# Patient Record
Sex: MALE | Race: WHITE | Employment: FULL TIME | URBAN - METROPOLITAN AREA
[De-identification: names, ages, dates, MRNs, and addresses within clinical notes are randomized per-mention and may not be internally consistent; named-entity substitution may affect disease eponyms.]

---

## 2018-07-09 PROBLEM — R77.8 ELEVATED TROPONIN: Status: ACTIVE | Noted: 2018-07-09

## 2018-07-09 PROBLEM — R07.9 CHEST PAIN IN ADULT: Status: ACTIVE | Noted: 2018-07-09

## 2018-07-10 PROBLEM — I21.4 NSTEMI (NON-ST ELEVATED MYOCARDIAL INFARCTION) (HCC): Status: ACTIVE | Noted: 2018-07-10

## 2018-07-11 PROBLEM — I25.10 CORONARY ARTERY DISEASE DUE TO LIPID RICH PLAQUE: Status: ACTIVE | Noted: 2018-07-11

## 2018-07-11 PROBLEM — Z95.5 STATUS POST INSERTION OF DRUG-ELUTING STENT INTO LEFT ANTERIOR DESCENDING (LAD) ARTERY: Status: ACTIVE | Noted: 2018-07-11

## 2018-07-11 PROBLEM — I25.83 CORONARY ARTERY DISEASE DUE TO LIPID RICH PLAQUE: Status: ACTIVE | Noted: 2018-07-11

## 2018-07-18 ENCOUNTER — OFFICE VISIT (OUTPATIENT)
Dept: CARDIOLOGY CLINIC | Age: 58
End: 2018-07-18

## 2018-07-18 VITALS
SYSTOLIC BLOOD PRESSURE: 110 MMHG | HEART RATE: 78 BPM | WEIGHT: 261 LBS | BODY MASS INDEX: 34.43 KG/M2 | DIASTOLIC BLOOD PRESSURE: 72 MMHG

## 2018-07-18 DIAGNOSIS — I21.4 NSTEMI (NON-ST ELEVATED MYOCARDIAL INFARCTION) (HCC): Primary | ICD-10-CM

## 2018-07-18 DIAGNOSIS — Z95.5 STATUS POST INSERTION OF DRUG-ELUTING STENT INTO LEFT ANTERIOR DESCENDING (LAD) ARTERY: ICD-10-CM

## 2018-07-18 DIAGNOSIS — E78.2 HYPERLIPEMIA, MIXED: ICD-10-CM

## 2018-07-18 DIAGNOSIS — I10 ESSENTIAL HYPERTENSION: ICD-10-CM

## 2018-07-18 PROCEDURE — 93000 ELECTROCARDIOGRAM COMPLETE: CPT | Performed by: NURSE PRACTITIONER

## 2018-07-18 PROCEDURE — 99214 OFFICE O/P EST MOD 30 MIN: CPT | Performed by: NURSE PRACTITIONER

## 2018-07-18 NOTE — PROGRESS NOTES
07/10/2018     No results found for: TSH, A0VJGQL, X0CVFZV, THYROIDAB  Lab Results   Component Value Date    WBC 11.0 07/11/2018    HGB 15.6 07/11/2018    HCT 44.9 07/11/2018    MCV 88.1 07/11/2018     07/11/2018     No components found for: CHLPL  Lab Results   Component Value Date    TRIG 172 (H) 07/10/2018     Lab Results   Component Value Date    HDL 36 (L) 07/10/2018     Lab Results   Component Value Date    LDLCALC 102 (H) 07/10/2018     Lab Results   Component Value Date    LABVLDL 34 07/10/2018     Radiology Review:  Pertinent images / reports were reviewed as a part of this visit and reveals the following:    Cardiac Cath 7/9/18:  Anatomy:   LM-normal  LAD-patent proximal stent, 100% mid  Cx-20% proximal and mid  OM1- patent  RCA-10% distal diffuse, codominant  RPDA- patent  LVEF- 40% with mid to distal anterior hypokinesis  PCI: % to 0% with a 2.75 mm x 16 mm [employer] Synergy INNA. Impression:  1. Critically occluded mid LAD with successful revascularization using INNA. 2.  Mild to moderate LV systolic dysfunction. Plan:  1. Aspirin 81 mg daily indefinitely  2. Effient/equivalent for minimum of 1 year. 3.  Aspirin 81 mg daily, Effient 10 mg daily, Lipitor 40 mg nightly, Toprol-XL 25 mg daily, lisinopril 5 mg by mouth daily. Compliance with medical regimen discussed in detail with patient and daughter pre-and postprocedure. Last ECHO: 7/9/18  Normal left ventricle size, wall thickness and systolic function with an EF   of 55%     Although not well visualized, there is possible mild hyopkinesis of the   apicoseptal wall     Diastolic filling parameters suggest grade I diastolic dysfunction. E/e'=9.7. Trivial tricuspid regurgitation with RVSP of 31 mmHg.          Assessment:   ~ Coronary artery disease    7/9/18: S/p cardiac angiogram/ PCI of LAD     On ASA, Effient,  Lisinopril, and Lipitor    Patient denies any anginal symptoms    Plan: continue current

## 2018-07-23 ENCOUNTER — HOSPITAL ENCOUNTER (OUTPATIENT)
Dept: NON INVASIVE DIAGNOSTICS | Age: 58
Discharge: HOME | End: 2018-07-23
Attending: NURSE PRACTITIONER | Admitting: NURSE PRACTITIONER

## 2018-07-23 DIAGNOSIS — I21.4 NON-ST ELEVATION (NSTEMI) MYOCARDIAL INFARCTION (HCC): ICD-10-CM

## 2018-08-06 ENCOUNTER — TELEPHONE (OUTPATIENT)
Dept: CARDIOLOGY CLINIC | Age: 58
End: 2018-08-06

## 2018-08-06 NOTE — TELEPHONE ENCOUNTER
Medication Refill    When was your last appointment with cardiology?  (if 1year or longer, please schedule an appointment)    Medication needing refilled:ASA 81mg , atorvastatin , lisinopril , effient   Doseage of the medication:    How are you taking this medication (QD, BID, TID, QID, PRN):    Patient want a 30 or 90 day supply called in:    Which Pharmacy are we sending the medication to:[facility] [address] in [address] Phone number:    Pharmacy Fax number:    Saw npkk 7/18/18 needs refills by tomorrow

## 2018-08-07 RX ORDER — ASPIRIN 81 MG/1
81 TABLET, CHEWABLE ORAL DAILY
Qty: 90 TABLET | Refills: 3 | Status: SHIPPED | OUTPATIENT
Start: 2018-08-07

## 2018-08-07 RX ORDER — PRASUGREL 10 MG/1
10 TABLET, FILM COATED ORAL DAILY
Qty: 90 TABLET | Refills: 1 | Status: SHIPPED | OUTPATIENT
Start: 2018-08-07 | End: 2018-10-26 | Stop reason: SDUPTHER

## 2018-08-07 RX ORDER — LISINOPRIL 5 MG/1
5 TABLET ORAL DAILY
Qty: 90 TABLET | Refills: 3 | Status: SHIPPED | OUTPATIENT
Start: 2018-08-07 | End: 2019-08-22 | Stop reason: DRUGHIGH

## 2018-08-07 RX ORDER — ATORVASTATIN CALCIUM 40 MG/1
40 TABLET, FILM COATED ORAL NIGHTLY
Qty: 90 TABLET | Refills: 3 | Status: SHIPPED | OUTPATIENT
Start: 2018-08-07 | End: 2019-09-18 | Stop reason: SDUPTHER

## 2018-08-08 PROBLEM — R77.8 ELEVATED TROPONIN: Status: RESOLVED | Noted: 2018-07-09 | Resolved: 2018-08-08

## 2018-10-15 NOTE — PROGRESS NOTES
[address]     Outpatient Follow Up Note    CHIEF COMPLAINT / HPI:  Follow Up secondary to coronary artery disease/ Hypertension/ and hyperlipidemia       Meron Kang is 61 y.o. male who presents today for a routine follow up  related to the above mentioned issues. Subjective:   Since the time of last office visit, the patient admits their symptoms have      HPI: patient is being seen for a routine office visit secondary to CAD s/p PCI of LAD ( 7/9/18)/ hypertension/ and hyperlipidemia. Today's visit patient stated he stopped taking all of his medication a week ago. He is only taken supplements. He has agreed at today's OV to take Effient and Lipitor. He denies any chest pain, palpitations, SOB, dizziness, or edema. Past Medical History:   Diagnosis Date    CAD (coronary artery disease)      Social History:    History   Smoking Status    Never Smoker   Smokeless Tobacco    Never Used     Current Medications:  Current Outpatient Prescriptions   Medication Sig Dispense Refill    aspirin 81 MG chewable tablet Take 1 tablet by mouth daily 90 tablet 3    atorvastatin (LIPITOR) 40 MG tablet Take 1 tablet by mouth nightly 90 tablet 3    lisinopril (PRINIVIL;ZESTRIL) 5 MG tablet Take 1 tablet by mouth daily 90 tablet 3    prasugrel (EFFIENT) 10 MG TABS Take 1 tablet by mouth daily 90 tablet 1    nitroGLYCERIN (NITROSTAT) 0.4 MG SL tablet up to max of 3 total doses. If no relief after 1 dose, call 911. 25 tablet 3     No current facility-administered medications for this visit. REVIEW OF SYSTEMS:   CONSTITUTIONAL: No major weight gain or loss, fatigue, weakness, night sweats or fever. There's been no change in energy level, sleep pattern, or activity level. HEENT: No new vision difficulties or ringing in the ears. RESPIRATORY: No new SOB, PND, orthopnea or cough.    CARDIOVASCULAR: See HPI  GI: No nausea, vomiting, diarrhea, constipation, abdominal pain or changes in bowel

## 2018-10-16 ENCOUNTER — OFFICE VISIT (OUTPATIENT)
Dept: CARDIOLOGY CLINIC | Age: 58
End: 2018-10-16
Payer: COMMERCIAL

## 2018-10-16 VITALS
HEIGHT: 73 IN | DIASTOLIC BLOOD PRESSURE: 80 MMHG | OXYGEN SATURATION: 99 % | BODY MASS INDEX: 33.24 KG/M2 | SYSTOLIC BLOOD PRESSURE: 120 MMHG | WEIGHT: 250.8 LBS | HEART RATE: 82 BPM

## 2018-10-16 DIAGNOSIS — I10 ESSENTIAL HYPERTENSION: ICD-10-CM

## 2018-10-16 DIAGNOSIS — I25.10 CORONARY ARTERY DISEASE DUE TO LIPID RICH PLAQUE: Primary | ICD-10-CM

## 2018-10-16 DIAGNOSIS — I25.83 CORONARY ARTERY DISEASE DUE TO LIPID RICH PLAQUE: Primary | ICD-10-CM

## 2018-10-16 DIAGNOSIS — E78.2 HYPERLIPEMIA, MIXED: ICD-10-CM

## 2018-10-16 PROCEDURE — 99214 OFFICE O/P EST MOD 30 MIN: CPT | Performed by: NURSE PRACTITIONER

## 2018-10-16 RX ORDER — CHLORAL HYDRATE 500 MG
1000 CAPSULE ORAL DAILY
COMMUNITY

## 2018-10-16 RX ORDER — ASCORBIC ACID 500 MG
500 TABLET ORAL DAILY
COMMUNITY

## 2018-10-16 RX ORDER — LANOLIN ALCOHOL/MO/W.PET/CERES
1000 CREAM (GRAM) TOPICAL DAILY
COMMUNITY

## 2018-10-25 ENCOUNTER — TELEPHONE (OUTPATIENT)
Dept: CARDIOLOGY CLINIC | Age: 58
End: 2018-10-25

## 2018-10-26 RX ORDER — PRASUGREL 10 MG/1
10 TABLET, FILM COATED ORAL DAILY
Qty: 90 TABLET | Refills: 1 | Status: SHIPPED | OUTPATIENT
Start: 2018-10-26 | End: 2019-08-22 | Stop reason: ALTCHOICE

## 2019-01-14 ENCOUNTER — HOSPITAL ENCOUNTER (INPATIENT)
Age: 59
LOS: 1 days | Discharge: HOME | DRG: 378 | End: 2019-01-15
Attending: EMERGENCY MEDICINE | Admitting: INTERNAL MEDICINE
Payer: COMMERCIAL

## 2019-01-14 DIAGNOSIS — K92.2 ACUTE UPPER GI BLEED: Primary | ICD-10-CM

## 2019-01-14 DIAGNOSIS — R58 HYPOTENSION DUE TO BLOOD LOSS: ICD-10-CM

## 2019-01-14 DIAGNOSIS — I95.89 HYPOTENSION DUE TO BLOOD LOSS: ICD-10-CM

## 2019-01-14 PROCEDURE — 99291 CRITICAL CARE FIRST HOUR: CPT

## 2019-01-14 PROCEDURE — 93005 ELECTROCARDIOGRAM TRACING: CPT | Performed by: INTERNAL MEDICINE

## 2019-01-14 RX ORDER — 0.9 % SODIUM CHLORIDE 0.9 %
1000 INTRAVENOUS SOLUTION INTRAVENOUS ONCE
Status: COMPLETED | OUTPATIENT
Start: 2019-01-15 | End: 2019-01-15

## 2019-01-15 ENCOUNTER — ANESTHESIA (OUTPATIENT)
Dept: ENDOSCOPY | Age: 59
DRG: 378 | End: 2019-01-15
Payer: COMMERCIAL

## 2019-01-15 ENCOUNTER — ANESTHESIA EVENT (OUTPATIENT)
Dept: ENDOSCOPY | Age: 59
DRG: 378 | End: 2019-01-15
Payer: COMMERCIAL

## 2019-01-15 VITALS
HEIGHT: 73 IN | DIASTOLIC BLOOD PRESSURE: 70 MMHG | RESPIRATION RATE: 11 BRPM | OXYGEN SATURATION: 99 % | TEMPERATURE: 98.4 F | HEART RATE: 83 BPM | WEIGHT: 243.61 LBS | SYSTOLIC BLOOD PRESSURE: 108 MMHG | BODY MASS INDEX: 32.29 KG/M2

## 2019-01-15 VITALS
OXYGEN SATURATION: 98 % | RESPIRATION RATE: 16 BRPM | DIASTOLIC BLOOD PRESSURE: 55 MMHG | SYSTOLIC BLOOD PRESSURE: 103 MMHG

## 2019-01-15 PROBLEM — K92.2 GI BLEED: Status: ACTIVE | Noted: 2019-01-15

## 2019-01-15 PROBLEM — D62 ACUTE BLOOD LOSS ANEMIA: Status: ACTIVE | Noted: 2019-01-15

## 2019-01-15 LAB
A/G RATIO: 1.9 (ref 1.1–2.2)
ABO/RH: NORMAL
ALBUMIN SERPL-MCNC: 3.9 G/DL (ref 3.4–5)
ALP BLD-CCNC: 38 U/L (ref 40–129)
ALT SERPL-CCNC: 15 U/L (ref 10–40)
ANION GAP SERPL CALCULATED.3IONS-SCNC: 10 MMOL/L (ref 3–16)
ANTIBODY SCREEN: NORMAL
APTT: 24 SEC (ref 26–36)
AST SERPL-CCNC: 12 U/L (ref 15–37)
BASOPHILS ABSOLUTE: 0 K/UL (ref 0–0.2)
BASOPHILS RELATIVE PERCENT: 0.3 %
BILIRUB SERPL-MCNC: 1.6 MG/DL (ref 0–1)
BLOOD BANK DISPENSE STATUS: NORMAL
BLOOD BANK PRODUCT CODE: NORMAL
BPU ID: NORMAL
BUN BLDV-MCNC: 51 MG/DL (ref 7–20)
CALCIUM SERPL-MCNC: 8.8 MG/DL (ref 8.3–10.6)
CHLORIDE BLD-SCNC: 105 MMOL/L (ref 99–110)
CO2: 24 MMOL/L (ref 21–32)
CREAT SERPL-MCNC: 0.6 MG/DL (ref 0.9–1.3)
DESCRIPTION BLOOD BANK: NORMAL
EKG ATRIAL RATE: 127 BPM
EKG DIAGNOSIS: NORMAL
EKG P AXIS: 67 DEGREES
EKG P-R INTERVAL: 128 MS
EKG Q-T INTERVAL: 322 MS
EKG QRS DURATION: 84 MS
EKG QTC CALCULATION (BAZETT): 467 MS
EKG R AXIS: 55 DEGREES
EKG T AXIS: 70 DEGREES
EKG VENTRICULAR RATE: 127 BPM
EOSINOPHILS ABSOLUTE: 0 K/UL (ref 0–0.6)
EOSINOPHILS RELATIVE PERCENT: 0.2 %
GFR AFRICAN AMERICAN: >60
GFR NON-AFRICAN AMERICAN: >60
GLOBULIN: 2.1 G/DL
GLUCOSE BLD-MCNC: 202 MG/DL (ref 70–99)
HCT VFR BLD CALC: 26.5 % (ref 40.5–52.5)
HCT VFR BLD CALC: 30.1 % (ref 40.5–52.5)
HCT VFR BLD CALC: 30.6 % (ref 40.5–52.5)
HCT VFR BLD CALC: 31.6 % (ref 40.5–52.5)
HEMOGLOBIN: 10.1 G/DL (ref 13.5–17.5)
HEMOGLOBIN: 10.3 G/DL (ref 13.5–17.5)
HEMOGLOBIN: 10.7 G/DL (ref 13.5–17.5)
HEMOGLOBIN: 9 G/DL (ref 13.5–17.5)
INR BLD: 1.21 (ref 0.86–1.14)
LYMPHOCYTES ABSOLUTE: 2.8 K/UL (ref 1–5.1)
LYMPHOCYTES RELATIVE PERCENT: 19.7 %
MCH RBC QN AUTO: 29.4 PG (ref 26–34)
MCH RBC QN AUTO: 30.4 PG (ref 26–34)
MCHC RBC AUTO-ENTMCNC: 33.5 G/DL (ref 31–36)
MCHC RBC AUTO-ENTMCNC: 33.9 G/DL (ref 31–36)
MCV RBC AUTO: 87.7 FL (ref 80–100)
MCV RBC AUTO: 89.5 FL (ref 80–100)
MONOCYTES ABSOLUTE: 0.5 K/UL (ref 0–1.3)
MONOCYTES RELATIVE PERCENT: 3.6 %
NEUTROPHILS ABSOLUTE: 10.9 K/UL (ref 1.7–7.7)
NEUTROPHILS RELATIVE PERCENT: 76.2 %
OCCULT BLOOD DIAGNOSTIC: ABNORMAL
PDW BLD-RTO: 13.1 % (ref 12.4–15.4)
PDW BLD-RTO: 13.8 % (ref 12.4–15.4)
PLATELET # BLD: 215 K/UL (ref 135–450)
PLATELET # BLD: 244 K/UL (ref 135–450)
PMV BLD AUTO: 7.5 FL (ref 5–10.5)
PMV BLD AUTO: 8.2 FL (ref 5–10.5)
POTASSIUM REFLEX MAGNESIUM: 4.4 MMOL/L (ref 3.5–5.1)
PROTHROMBIN TIME: 13.8 SEC (ref 9.8–13)
RBC # BLD: 3.49 M/UL (ref 4.2–5.9)
RBC # BLD: 3.54 M/UL (ref 4.2–5.9)
SODIUM BLD-SCNC: 139 MMOL/L (ref 136–145)
TOTAL PROTEIN: 6 G/DL (ref 6.4–8.2)
WBC # BLD: 10.1 K/UL (ref 4–11)
WBC # BLD: 14.3 K/UL (ref 4–11)

## 2019-01-15 PROCEDURE — 2709999900 HC NON-CHARGEABLE SUPPLY: Performed by: INTERNAL MEDICINE

## 2019-01-15 PROCEDURE — 2580000003 HC RX 258: Performed by: ANESTHESIOLOGY

## 2019-01-15 PROCEDURE — 93010 ELECTROCARDIOGRAM REPORT: CPT | Performed by: INTERNAL MEDICINE

## 2019-01-15 PROCEDURE — 85610 PROTHROMBIN TIME: CPT

## 2019-01-15 PROCEDURE — 88305 TISSUE EXAM BY PATHOLOGIST: CPT

## 2019-01-15 PROCEDURE — 86923 COMPATIBILITY TEST ELECTRIC: CPT

## 2019-01-15 PROCEDURE — P9016 RBC LEUKOCYTES REDUCED: HCPCS

## 2019-01-15 PROCEDURE — 85025 COMPLETE CBC W/AUTO DIFF WBC: CPT

## 2019-01-15 PROCEDURE — 3700000001 HC ADD 15 MINUTES (ANESTHESIA): Performed by: INTERNAL MEDICINE

## 2019-01-15 PROCEDURE — 3700000000 HC ANESTHESIA ATTENDED CARE: Performed by: INTERNAL MEDICINE

## 2019-01-15 PROCEDURE — 0DB78ZX EXCISION OF STOMACH, PYLORUS, VIA NATURAL OR ARTIFICIAL OPENING ENDOSCOPIC, DIAGNOSTIC: ICD-10-PCS | Performed by: INTERNAL MEDICINE

## 2019-01-15 PROCEDURE — 6360000002 HC RX W HCPCS: Performed by: NURSE ANESTHETIST, CERTIFIED REGISTERED

## 2019-01-15 PROCEDURE — 6370000000 HC RX 637 (ALT 250 FOR IP): Performed by: INTERNAL MEDICINE

## 2019-01-15 PROCEDURE — 85018 HEMOGLOBIN: CPT

## 2019-01-15 PROCEDURE — 2580000003 HC RX 258: Performed by: EMERGENCY MEDICINE

## 2019-01-15 PROCEDURE — 85014 HEMATOCRIT: CPT

## 2019-01-15 PROCEDURE — C9113 INJ PANTOPRAZOLE SODIUM, VIA: HCPCS | Performed by: PHYSICIAN ASSISTANT

## 2019-01-15 PROCEDURE — G0328 FECAL BLOOD SCRN IMMUNOASSAY: HCPCS

## 2019-01-15 PROCEDURE — 7100000001 HC PACU RECOVERY - ADDTL 15 MIN: Performed by: INTERNAL MEDICINE

## 2019-01-15 PROCEDURE — 3609012400 HC EGD TRANSORAL BIOPSY SINGLE/MULTIPLE: Performed by: INTERNAL MEDICINE

## 2019-01-15 PROCEDURE — 86900 BLOOD TYPING SEROLOGIC ABO: CPT

## 2019-01-15 PROCEDURE — 2580000003 HC RX 258: Performed by: INTERNAL MEDICINE

## 2019-01-15 PROCEDURE — P9035 PLATELET PHERES LEUKOREDUCED: HCPCS

## 2019-01-15 PROCEDURE — 85730 THROMBOPLASTIN TIME PARTIAL: CPT

## 2019-01-15 PROCEDURE — 2500000003 HC RX 250 WO HCPCS: Performed by: NURSE ANESTHETIST, CERTIFIED REGISTERED

## 2019-01-15 PROCEDURE — 7100000000 HC PACU RECOVERY - FIRST 15 MIN: Performed by: INTERNAL MEDICINE

## 2019-01-15 PROCEDURE — 6360000002 HC RX W HCPCS: Performed by: PHYSICIAN ASSISTANT

## 2019-01-15 PROCEDURE — 88342 IMHCHEM/IMCYTCHM 1ST ANTB: CPT

## 2019-01-15 PROCEDURE — 85027 COMPLETE CBC AUTOMATED: CPT

## 2019-01-15 PROCEDURE — 86850 RBC ANTIBODY SCREEN: CPT

## 2019-01-15 PROCEDURE — 86901 BLOOD TYPING SEROLOGIC RH(D): CPT

## 2019-01-15 PROCEDURE — 3609013000 HC EGD TRANSORAL CONTROL BLEEDING ANY METHOD: Performed by: INTERNAL MEDICINE

## 2019-01-15 PROCEDURE — 36430 TRANSFUSION BLD/BLD COMPNT: CPT

## 2019-01-15 PROCEDURE — 2720000010 HC SURG SUPPLY STERILE: Performed by: INTERNAL MEDICINE

## 2019-01-15 PROCEDURE — 96365 THER/PROPH/DIAG IV INF INIT: CPT

## 2019-01-15 PROCEDURE — 2580000003 HC RX 258: Performed by: PHYSICIAN ASSISTANT

## 2019-01-15 PROCEDURE — 94760 N-INVAS EAR/PLS OXIMETRY 1: CPT

## 2019-01-15 PROCEDURE — 80053 COMPREHEN METABOLIC PANEL: CPT

## 2019-01-15 PROCEDURE — 0W3P8ZZ CONTROL BLEEDING IN GASTROINTESTINAL TRACT, VIA NATURAL OR ARTIFICIAL OPENING ENDOSCOPIC: ICD-10-PCS | Performed by: INTERNAL MEDICINE

## 2019-01-15 PROCEDURE — 2000000000 HC ICU R&B

## 2019-01-15 RX ORDER — ONDANSETRON 2 MG/ML
4 INJECTION INTRAMUSCULAR; INTRAVENOUS EVERY 6 HOURS PRN
Status: DISCONTINUED | OUTPATIENT
Start: 2019-01-15 | End: 2019-01-15 | Stop reason: HOSPADM

## 2019-01-15 RX ORDER — 0.9 % SODIUM CHLORIDE 0.9 %
250 INTRAVENOUS SOLUTION INTRAVENOUS ONCE
Status: COMPLETED | OUTPATIENT
Start: 2019-01-15 | End: 2019-01-15

## 2019-01-15 RX ORDER — SODIUM CHLORIDE 0.9 % (FLUSH) 0.9 %
10 SYRINGE (ML) INJECTION EVERY 12 HOURS SCHEDULED
Status: DISCONTINUED | OUTPATIENT
Start: 2019-01-15 | End: 2019-01-15 | Stop reason: HOSPADM

## 2019-01-15 RX ORDER — PANTOPRAZOLE SODIUM 40 MG/1
40 TABLET, DELAYED RELEASE ORAL
Qty: 30 TABLET | Refills: 3 | Status: SHIPPED | OUTPATIENT
Start: 2019-01-16 | End: 2020-05-04 | Stop reason: SDUPTHER

## 2019-01-15 RX ORDER — GLYCOPYRROLATE 0.2 MG/ML
INJECTION INTRAMUSCULAR; INTRAVENOUS PRN
Status: DISCONTINUED | OUTPATIENT
Start: 2019-01-15 | End: 2019-01-15 | Stop reason: SDUPTHER

## 2019-01-15 RX ORDER — PANTOPRAZOLE SODIUM 40 MG/1
40 TABLET, DELAYED RELEASE ORAL
Status: DISCONTINUED | OUTPATIENT
Start: 2019-01-15 | End: 2019-01-15 | Stop reason: HOSPADM

## 2019-01-15 RX ORDER — SODIUM CHLORIDE 9 MG/ML
INJECTION, SOLUTION INTRAVENOUS CONTINUOUS
Status: DISCONTINUED | OUTPATIENT
Start: 2019-01-15 | End: 2019-01-15 | Stop reason: HOSPADM

## 2019-01-15 RX ORDER — PROPOFOL 10 MG/ML
INJECTION, EMULSION INTRAVENOUS PRN
Status: DISCONTINUED | OUTPATIENT
Start: 2019-01-15 | End: 2019-01-15 | Stop reason: SDUPTHER

## 2019-01-15 RX ORDER — SODIUM CHLORIDE 0.9 % (FLUSH) 0.9 %
10 SYRINGE (ML) INJECTION PRN
Status: DISCONTINUED | OUTPATIENT
Start: 2019-01-15 | End: 2019-01-15 | Stop reason: HOSPADM

## 2019-01-15 RX ORDER — LIDOCAINE HYDROCHLORIDE 20 MG/ML
INJECTION, SOLUTION EPIDURAL; INFILTRATION; INTRACAUDAL; PERINEURAL PRN
Status: DISCONTINUED | OUTPATIENT
Start: 2019-01-15 | End: 2019-01-15 | Stop reason: SDUPTHER

## 2019-01-15 RX ADMIN — SODIUM CHLORIDE 1000 ML: 9 INJECTION, SOLUTION INTRAVENOUS at 00:32

## 2019-01-15 RX ADMIN — PANTOPRAZOLE SODIUM 40 MG: 40 TABLET, DELAYED RELEASE ORAL at 12:24

## 2019-01-15 RX ADMIN — SODIUM CHLORIDE 8 MG/HR: 9 INJECTION, SOLUTION INTRAVENOUS at 01:02

## 2019-01-15 RX ADMIN — SODIUM CHLORIDE: 9 INJECTION, SOLUTION INTRAVENOUS at 10:12

## 2019-01-15 RX ADMIN — SODIUM CHLORIDE 1000 ML: 9 INJECTION, SOLUTION INTRAVENOUS at 00:31

## 2019-01-15 RX ADMIN — SODIUM CHLORIDE 80 MG: 9 INJECTION, SOLUTION INTRAVENOUS at 00:32

## 2019-01-15 RX ADMIN — PROPOFOL 200 MG: 10 INJECTION, EMULSION INTRAVENOUS at 10:40

## 2019-01-15 RX ADMIN — LIDOCAINE HYDROCHLORIDE 100 MG: 20 INJECTION, SOLUTION EPIDURAL; INFILTRATION; INTRACAUDAL; PERINEURAL at 10:40

## 2019-01-15 RX ADMIN — Medication 10 ML: at 07:59

## 2019-01-15 RX ADMIN — SODIUM CHLORIDE 250 ML: 9 INJECTION, SOLUTION INTRAVENOUS at 02:43

## 2019-01-15 RX ADMIN — GLYCOPYRROLATE 0.2 MG: 0.2 INJECTION, SOLUTION INTRAMUSCULAR; INTRAVENOUS at 10:38

## 2019-01-15 RX ADMIN — PROPOFOL 50 MG: 10 INJECTION, EMULSION INTRAVENOUS at 10:45

## 2019-01-15 ASSESSMENT — PULMONARY FUNCTION TESTS
PIF_VALUE: 0

## 2019-01-15 ASSESSMENT — ENCOUNTER SYMPTOMS
COLOR CHANGE: 0
ABDOMINAL PAIN: 0
NAUSEA: 0
CONSTIPATION: 0
BLOOD IN STOOL: 1
SHORTNESS OF BREATH: 0
VOMITING: 0
SHORTNESS OF BREATH: 0
DIARRHEA: 0
BACK PAIN: 0

## 2019-01-15 ASSESSMENT — PAIN SCALES - GENERAL
PAINLEVEL_OUTOF10: 0

## 2019-01-15 ASSESSMENT — PAIN - FUNCTIONAL ASSESSMENT: PAIN_FUNCTIONAL_ASSESSMENT: 0-10

## 2019-02-11 ENCOUNTER — HOSPITAL ENCOUNTER (INPATIENT)
Age: 59
LOS: 2 days | Discharge: HOME | DRG: 274 | End: 2019-02-13
Attending: EMERGENCY MEDICINE | Admitting: FAMILY MEDICINE
Payer: COMMERCIAL

## 2019-02-11 ENCOUNTER — APPOINTMENT (OUTPATIENT)
Dept: GENERAL RADIOLOGY | Age: 59
DRG: 274 | End: 2019-02-11
Payer: COMMERCIAL

## 2019-02-11 DIAGNOSIS — I47.1 SVT (SUPRAVENTRICULAR TACHYCARDIA) (HCC): Primary | ICD-10-CM

## 2019-02-11 LAB
A/G RATIO: 1.7 (ref 1.1–2.2)
ALBUMIN SERPL-MCNC: 4 G/DL (ref 3.4–5)
ALP BLD-CCNC: 50 U/L (ref 40–129)
ALT SERPL-CCNC: 29 U/L (ref 10–40)
ANION GAP SERPL CALCULATED.3IONS-SCNC: 11 MMOL/L (ref 3–16)
AST SERPL-CCNC: 32 U/L (ref 15–37)
BASOPHILS ABSOLUTE: 0 K/UL (ref 0–0.2)
BASOPHILS RELATIVE PERCENT: 0.4 %
BILIRUB SERPL-MCNC: 0.6 MG/DL (ref 0–1)
BUN BLDV-MCNC: 13 MG/DL (ref 7–20)
CALCIUM SERPL-MCNC: 8.8 MG/DL (ref 8.3–10.6)
CHLORIDE BLD-SCNC: 103 MMOL/L (ref 99–110)
CO2: 23 MMOL/L (ref 21–32)
CREAT SERPL-MCNC: 0.7 MG/DL (ref 0.9–1.3)
EKG ATRIAL RATE: 74 BPM
EKG DIAGNOSIS: NORMAL
EKG P AXIS: 49 DEGREES
EKG P-R INTERVAL: 154 MS
EKG Q-T INTERVAL: 408 MS
EKG QRS DURATION: 88 MS
EKG QTC CALCULATION (BAZETT): 452 MS
EKG R AXIS: 54 DEGREES
EKG T AXIS: 68 DEGREES
EKG VENTRICULAR RATE: 74 BPM
EOSINOPHILS ABSOLUTE: 0.2 K/UL (ref 0–0.6)
EOSINOPHILS RELATIVE PERCENT: 2.7 %
GFR AFRICAN AMERICAN: >60
GFR NON-AFRICAN AMERICAN: >60
GLOBULIN: 2.4 G/DL
GLUCOSE BLD-MCNC: 113 MG/DL (ref 70–99)
HCT VFR BLD CALC: 33.3 % (ref 40.5–52.5)
HEMOGLOBIN: 11.1 G/DL (ref 13.5–17.5)
LYMPHOCYTES ABSOLUTE: 1.2 K/UL (ref 1–5.1)
LYMPHOCYTES RELATIVE PERCENT: 18.4 %
MAGNESIUM: 2 MG/DL (ref 1.8–2.4)
MCH RBC QN AUTO: 28.6 PG (ref 26–34)
MCHC RBC AUTO-ENTMCNC: 33.4 G/DL (ref 31–36)
MCV RBC AUTO: 85.8 FL (ref 80–100)
MONOCYTES ABSOLUTE: 0.6 K/UL (ref 0–1.3)
MONOCYTES RELATIVE PERCENT: 8.7 %
NEUTROPHILS ABSOLUTE: 4.7 K/UL (ref 1.7–7.7)
NEUTROPHILS RELATIVE PERCENT: 69.8 %
PDW BLD-RTO: 13.8 % (ref 12.4–15.4)
PLATELET # BLD: 210 K/UL (ref 135–450)
PMV BLD AUTO: 8.1 FL (ref 5–10.5)
POTASSIUM SERPL-SCNC: 4.4 MMOL/L (ref 3.5–5.1)
PRO-BNP: 182 PG/ML (ref 0–124)
RBC # BLD: 3.88 M/UL (ref 4.2–5.9)
SODIUM BLD-SCNC: 137 MMOL/L (ref 136–145)
TOTAL PROTEIN: 6.4 G/DL (ref 6.4–8.2)
TROPONIN: <0.01 NG/ML
WBC # BLD: 6.8 K/UL (ref 4–11)

## 2019-02-11 PROCEDURE — 71046 X-RAY EXAM CHEST 2 VIEWS: CPT

## 2019-02-11 PROCEDURE — 99285 EMERGENCY DEPT VISIT HI MDM: CPT

## 2019-02-11 PROCEDURE — 2580000003 HC RX 258

## 2019-02-11 PROCEDURE — 2060000000 HC ICU INTERMEDIATE R&B

## 2019-02-11 PROCEDURE — 83735 ASSAY OF MAGNESIUM: CPT

## 2019-02-11 PROCEDURE — 80053 COMPREHEN METABOLIC PANEL: CPT

## 2019-02-11 PROCEDURE — 85025 COMPLETE CBC W/AUTO DIFF WBC: CPT

## 2019-02-11 PROCEDURE — 93005 ELECTROCARDIOGRAM TRACING: CPT | Performed by: PHYSICIAN ASSISTANT

## 2019-02-11 PROCEDURE — 6370000000 HC RX 637 (ALT 250 FOR IP): Performed by: FAMILY MEDICINE

## 2019-02-11 PROCEDURE — 99223 1ST HOSP IP/OBS HIGH 75: CPT | Performed by: INTERNAL MEDICINE

## 2019-02-11 PROCEDURE — 93010 ELECTROCARDIOGRAM REPORT: CPT | Performed by: INTERNAL MEDICINE

## 2019-02-11 PROCEDURE — 2580000003 HC RX 258: Performed by: FAMILY MEDICINE

## 2019-02-11 PROCEDURE — 84484 ASSAY OF TROPONIN QUANT: CPT

## 2019-02-11 PROCEDURE — 83880 ASSAY OF NATRIURETIC PEPTIDE: CPT

## 2019-02-11 PROCEDURE — 2500000003 HC RX 250 WO HCPCS

## 2019-02-11 RX ORDER — POTASSIUM CHLORIDE 20 MEQ/1
40 TABLET, EXTENDED RELEASE ORAL PRN
Status: DISCONTINUED | OUTPATIENT
Start: 2019-02-11 | End: 2019-02-13 | Stop reason: HOSPADM

## 2019-02-11 RX ORDER — SODIUM CHLORIDE 0.9 % (FLUSH) 0.9 %
10 SYRINGE (ML) INJECTION EVERY 12 HOURS SCHEDULED
Status: DISCONTINUED | OUTPATIENT
Start: 2019-02-11 | End: 2019-02-13 | Stop reason: HOSPADM

## 2019-02-11 RX ORDER — CHLORAL HYDRATE 500 MG
1000 CAPSULE ORAL DAILY
Status: DISCONTINUED | OUTPATIENT
Start: 2019-02-12 | End: 2019-02-11 | Stop reason: RX

## 2019-02-11 RX ORDER — POTASSIUM CHLORIDE 7.45 MG/ML
10 INJECTION INTRAVENOUS PRN
Status: DISCONTINUED | OUTPATIENT
Start: 2019-02-11 | End: 2019-02-13 | Stop reason: HOSPADM

## 2019-02-11 RX ORDER — SODIUM CHLORIDE 0.9 % (FLUSH) 0.9 %
10 SYRINGE (ML) INJECTION PRN
Status: DISCONTINUED | OUTPATIENT
Start: 2019-02-11 | End: 2019-02-13 | Stop reason: HOSPADM

## 2019-02-11 RX ORDER — LISINOPRIL 5 MG/1
5 TABLET ORAL DAILY
Status: DISCONTINUED | OUTPATIENT
Start: 2019-02-12 | End: 2019-02-13 | Stop reason: HOSPADM

## 2019-02-11 RX ORDER — ASPIRIN 81 MG/1
81 TABLET, CHEWABLE ORAL DAILY
Status: DISCONTINUED | OUTPATIENT
Start: 2019-02-12 | End: 2019-02-13 | Stop reason: HOSPADM

## 2019-02-11 RX ORDER — ASCORBIC ACID 500 MG
500 TABLET ORAL DAILY
Status: DISCONTINUED | OUTPATIENT
Start: 2019-02-12 | End: 2019-02-13 | Stop reason: HOSPADM

## 2019-02-11 RX ORDER — MAGNESIUM SULFATE 1 G/100ML
1 INJECTION INTRAVENOUS PRN
Status: DISCONTINUED | OUTPATIENT
Start: 2019-02-11 | End: 2019-02-13 | Stop reason: HOSPADM

## 2019-02-11 RX ORDER — LANOLIN ALCOHOL/MO/W.PET/CERES
1000 CREAM (GRAM) TOPICAL DAILY
Status: DISCONTINUED | OUTPATIENT
Start: 2019-02-12 | End: 2019-02-13 | Stop reason: HOSPADM

## 2019-02-11 RX ORDER — PANTOPRAZOLE SODIUM 40 MG/1
40 TABLET, DELAYED RELEASE ORAL
Status: DISCONTINUED | OUTPATIENT
Start: 2019-02-12 | End: 2019-02-13 | Stop reason: HOSPADM

## 2019-02-11 RX ORDER — ATORVASTATIN CALCIUM 40 MG/1
40 TABLET, FILM COATED ORAL NIGHTLY
Status: DISCONTINUED | OUTPATIENT
Start: 2019-02-11 | End: 2019-02-13 | Stop reason: HOSPADM

## 2019-02-11 RX ORDER — PRASUGREL 10 MG/1
10 TABLET, FILM COATED ORAL DAILY
Status: DISCONTINUED | OUTPATIENT
Start: 2019-02-12 | End: 2019-02-13 | Stop reason: HOSPADM

## 2019-02-11 RX ORDER — ONDANSETRON 2 MG/ML
4 INJECTION INTRAMUSCULAR; INTRAVENOUS EVERY 6 HOURS PRN
Status: DISCONTINUED | OUTPATIENT
Start: 2019-02-11 | End: 2019-02-13 | Stop reason: HOSPADM

## 2019-02-11 RX ADMIN — DESMOPRESSIN ACETATE 40 MG: 0.2 TABLET ORAL at 22:51

## 2019-02-11 RX ADMIN — Medication 10 ML: at 22:52

## 2019-02-11 ASSESSMENT — ENCOUNTER SYMPTOMS
NAUSEA: 0
COUGH: 0
CHEST TIGHTNESS: 0
VOMITING: 0
SHORTNESS OF BREATH: 0
WHEEZING: 0
EYE PAIN: 0
COLOR CHANGE: 0
DIARRHEA: 0
BACK PAIN: 0
ABDOMINAL PAIN: 0

## 2019-02-12 LAB
ANION GAP SERPL CALCULATED.3IONS-SCNC: 11 MMOL/L (ref 3–16)
BUN BLDV-MCNC: 9 MG/DL (ref 7–20)
CALCIUM SERPL-MCNC: 8.9 MG/DL (ref 8.3–10.6)
CHLORIDE BLD-SCNC: 103 MMOL/L (ref 99–110)
CO2: 24 MMOL/L (ref 21–32)
CREAT SERPL-MCNC: 0.6 MG/DL (ref 0.9–1.3)
GFR AFRICAN AMERICAN: >60
GFR NON-AFRICAN AMERICAN: >60
GLUCOSE BLD-MCNC: 127 MG/DL (ref 70–99)
HCT VFR BLD CALC: 34.5 % (ref 40.5–52.5)
HEMOGLOBIN: 11.5 G/DL (ref 13.5–17.5)
MCH RBC QN AUTO: 28.8 PG (ref 26–34)
MCHC RBC AUTO-ENTMCNC: 33.4 G/DL (ref 31–36)
MCV RBC AUTO: 86.3 FL (ref 80–100)
PDW BLD-RTO: 13.5 % (ref 12.4–15.4)
PLATELET # BLD: 201 K/UL (ref 135–450)
PMV BLD AUTO: 8 FL (ref 5–10.5)
POTASSIUM REFLEX MAGNESIUM: 4.3 MMOL/L (ref 3.5–5.1)
RBC # BLD: 4 M/UL (ref 4.2–5.9)
SODIUM BLD-SCNC: 138 MMOL/L (ref 136–145)
WBC # BLD: 7.7 K/UL (ref 4–11)

## 2019-02-12 PROCEDURE — 6360000002 HC RX W HCPCS

## 2019-02-12 PROCEDURE — 36415 COLL VENOUS BLD VENIPUNCTURE: CPT

## 2019-02-12 PROCEDURE — 99152 MOD SED SAME PHYS/QHP 5/>YRS: CPT | Performed by: INTERNAL MEDICINE

## 2019-02-12 PROCEDURE — 93653 COMPRE EP EVAL TX SVT: CPT | Performed by: INTERNAL MEDICINE

## 2019-02-12 PROCEDURE — 2500000003 HC RX 250 WO HCPCS

## 2019-02-12 PROCEDURE — 4A0234Z MEASUREMENT OF CARDIAC ELECTRICAL ACTIVITY, PERCUTANEOUS APPROACH: ICD-10-PCS | Performed by: INTERNAL MEDICINE

## 2019-02-12 PROCEDURE — C1730 CATH, EP, 19 OR FEW ELECT: HCPCS

## 2019-02-12 PROCEDURE — 93621 COMP EP EVL L PAC&REC C SINS: CPT | Performed by: INTERNAL MEDICINE

## 2019-02-12 PROCEDURE — C1894 INTRO/SHEATH, NON-LASER: HCPCS

## 2019-02-12 PROCEDURE — 6360000002 HC RX W HCPCS: Performed by: FAMILY MEDICINE

## 2019-02-12 PROCEDURE — 2580000003 HC RX 258: Performed by: FAMILY MEDICINE

## 2019-02-12 PROCEDURE — C1732 CATH, EP, DIAG/ABL, 3D/VECT: HCPCS

## 2019-02-12 PROCEDURE — 93623 PRGRMD STIMJ&PACG IV RX NFS: CPT | Performed by: INTERNAL MEDICINE

## 2019-02-12 PROCEDURE — 93613 INTRACARDIAC EPHYS 3D MAPG: CPT | Performed by: INTERNAL MEDICINE

## 2019-02-12 PROCEDURE — 02K83ZZ MAP CONDUCTION MECHANISM, PERCUTANEOUS APPROACH: ICD-10-PCS | Performed by: INTERNAL MEDICINE

## 2019-02-12 PROCEDURE — 99233 SBSQ HOSP IP/OBS HIGH 50: CPT | Performed by: INTERNAL MEDICINE

## 2019-02-12 PROCEDURE — 80048 BASIC METABOLIC PNL TOTAL CA: CPT

## 2019-02-12 PROCEDURE — C1769 GUIDE WIRE: HCPCS

## 2019-02-12 PROCEDURE — 02583ZZ DESTRUCTION OF CONDUCTION MECHANISM, PERCUTANEOUS APPROACH: ICD-10-PCS | Performed by: INTERNAL MEDICINE

## 2019-02-12 PROCEDURE — 85027 COMPLETE CBC AUTOMATED: CPT

## 2019-02-12 PROCEDURE — 6370000000 HC RX 637 (ALT 250 FOR IP): Performed by: FAMILY MEDICINE

## 2019-02-12 PROCEDURE — 2060000000 HC ICU INTERMEDIATE R&B

## 2019-02-12 PROCEDURE — 99153 MOD SED SAME PHYS/QHP EA: CPT | Performed by: INTERNAL MEDICINE

## 2019-02-12 RX ADMIN — LISINOPRIL 5 MG: 5 TABLET ORAL at 09:12

## 2019-02-12 RX ADMIN — PANTOPRAZOLE SODIUM 40 MG: 40 TABLET, DELAYED RELEASE ORAL at 09:12

## 2019-02-12 RX ADMIN — PRASUGREL HYDROCHLORIDE 10 MG: 10 TABLET, FILM COATED ORAL at 09:12

## 2019-02-12 RX ADMIN — Medication 10 ML: at 21:05

## 2019-02-12 RX ADMIN — CYANOCOBALAMIN TAB 1000 MCG 1000 MCG: 1000 TAB at 09:12

## 2019-02-12 RX ADMIN — DESMOPRESSIN ACETATE 40 MG: 0.2 TABLET ORAL at 21:05

## 2019-02-12 RX ADMIN — ENOXAPARIN SODIUM 40 MG: 40 INJECTION SUBCUTANEOUS at 09:13

## 2019-02-12 RX ADMIN — VITAMIN D, TAB 1000IU (100/BT) 1000 UNITS: 25 TAB at 09:12

## 2019-02-12 RX ADMIN — OXYCODONE HYDROCHLORIDE AND ACETAMINOPHEN 500 MG: 500 TABLET ORAL at 09:12

## 2019-02-12 RX ADMIN — Medication 10 ML: at 09:13

## 2019-02-12 RX ADMIN — ASPIRIN 81 MG 81 MG: 81 TABLET ORAL at 09:12

## 2019-02-12 ASSESSMENT — PAIN SCALES - GENERAL
PAINLEVEL_OUTOF10: 2
PAINLEVEL_OUTOF10: 0

## 2019-02-13 VITALS
DIASTOLIC BLOOD PRESSURE: 74 MMHG | HEIGHT: 73 IN | OXYGEN SATURATION: 97 % | SYSTOLIC BLOOD PRESSURE: 107 MMHG | BODY MASS INDEX: 32.5 KG/M2 | WEIGHT: 245.2 LBS | RESPIRATION RATE: 18 BRPM | TEMPERATURE: 97.8 F | HEART RATE: 75 BPM

## 2019-02-13 PROCEDURE — 99233 SBSQ HOSP IP/OBS HIGH 50: CPT | Performed by: INTERNAL MEDICINE

## 2019-02-13 PROCEDURE — 6370000000 HC RX 637 (ALT 250 FOR IP): Performed by: FAMILY MEDICINE

## 2019-02-13 RX ADMIN — LISINOPRIL 5 MG: 5 TABLET ORAL at 10:51

## 2019-02-13 RX ADMIN — VITAMIN D, TAB 1000IU (100/BT) 1000 UNITS: 25 TAB at 10:51

## 2019-02-13 RX ADMIN — ASPIRIN 81 MG 81 MG: 81 TABLET ORAL at 10:51

## 2019-02-13 RX ADMIN — CYANOCOBALAMIN TAB 1000 MCG 1000 MCG: 1000 TAB at 10:51

## 2019-02-13 RX ADMIN — OXYCODONE HYDROCHLORIDE AND ACETAMINOPHEN 500 MG: 500 TABLET ORAL at 10:51

## 2019-02-13 RX ADMIN — PRASUGREL HYDROCHLORIDE 10 MG: 10 TABLET, FILM COATED ORAL at 10:51

## 2019-02-13 RX ADMIN — PANTOPRAZOLE SODIUM 40 MG: 40 TABLET, DELAYED RELEASE ORAL at 07:01

## 2019-02-13 ASSESSMENT — PAIN SCALES - GENERAL
PAINLEVEL_OUTOF10: 0
PAINLEVEL_OUTOF10: 0

## 2019-03-25 ENCOUNTER — OFFICE VISIT (OUTPATIENT)
Dept: CARDIOLOGY CLINIC | Age: 59
End: 2019-03-25
Payer: COMMERCIAL

## 2019-03-25 VITALS
HEART RATE: 68 BPM | HEIGHT: 73 IN | DIASTOLIC BLOOD PRESSURE: 74 MMHG | BODY MASS INDEX: 33.27 KG/M2 | WEIGHT: 251 LBS | RESPIRATION RATE: 14 BRPM | SYSTOLIC BLOOD PRESSURE: 132 MMHG

## 2019-03-25 DIAGNOSIS — I47.1 SVT (SUPRAVENTRICULAR TACHYCARDIA) (HCC): Primary | ICD-10-CM

## 2019-03-25 PROCEDURE — 93000 ELECTROCARDIOGRAM COMPLETE: CPT | Performed by: NURSE PRACTITIONER

## 2019-03-25 PROCEDURE — 99213 OFFICE O/P EST LOW 20 MIN: CPT | Performed by: NURSE PRACTITIONER

## 2019-03-25 NOTE — PROGRESS NOTES
[facility]   Electrophysiology   Date: 3/25/2019  I had the privilege of visiting Brittany Arechiga in the office. CC: AVNRT  HPI:   Previous note Dr. Sudhir Jim 2/11/19-Sapna Gordy Cunningham is a 60 y.o. male with Hx of INNA to LAD in 7/2018 and recent GIB due to gastritis had an episode of palpitations , was found to be in Supraventricular tachycardia at 179 bpm and did not repond to 6 but did repond to 12 mg of adenosine. He had another short episode here. 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 Mesa. Doing well post ablation. Denies cp/pressure, palpitations, dyspnea, syncope/presyncope or any cardiac complaints. Past Medical History:   Diagnosis Date    CAD (coronary artery disease)         Past Surgical History:   Procedure Laterality Date    CORONARY ANGIOPLASTY WITH STENT PLACEMENT  2011    UPPER GASTROINTESTINAL ENDOSCOPY N/A 1/15/2019    EGD BIOPSY performed by Edy Caldwell MD at [address] [facility] N/A 1/15/2019    EGD CONTROL HEMORRHAGE performed by Edy Caldwell MD at [address] [facility] ENDOSCOPY       Allergies:  No Known Allergies    Social History:  Reviewed. reports that he has never smoked. He has never used smokeless tobacco. He reports that he does not drink alcohol or use drugs. Family History:  Reviewed. family history is not on file. Review of System:  All other systems reviewed and are negative except for that noted above.  Pertinent negatives are:     · General: negative for fever, chills   · Ophthalmic ROS: negative for - eye pain or loss of vision  · ENT ROS: negative for - headaches, sore throat   · Respiratory: negative for - cough, sputum  · Cardiovascular: Reviewed in HPI  · Gastrointestinal: negative for - abdominal pain, diarrhea, N/V  · Hematology: negative for - bleeding, blood clots, bruising or jaundice  · Genito-Urinary:  negative for - Dysuria or incontinence  · Musculoskeletal: negative for - Joint swelling, muscle pain  · Neurological: negative for - confusion, dizziness, headaches   · Psychiatric: No anxiety, no depression. · Dermatological: negative for - rash    Physical Examination:  Vitals:    03/25/19 0950   BP: 132/74   Pulse: 68   Resp: 14        · Constitutional: Oriented. No distress. · Head: Normocephalic and atraumatic. · Mouth/Throat: Oropharynx is clear and moist.   · Eyes: Conjunctivae normal. EOM are normal.   · Neck: Neck supple. No rigidity. No JVD present. · Cardiovascular: Normal rate, regular rhythm, S1&S2. · Pulmonary/Chest: Bilateral respiratory sounds. No wheezes, No rhonchi. · Abdominal: Soft. Bowel sounds present. No distension, No tenderness. · Musculoskeletal: No tenderness. No edema    · Lymphadenopathy: Has no cervical adenopathy. · Neurological: Alert and oriented. Cranial nerve appears intact, No Gross deficit   · Skin: Skin is warm and dry. No rash noted. · Psychiatric: Has a normal behavior     Labs, diagnostic and imaging results reviewed. ECG: SR, rate 70    Echo: 7/2018 Conclusions      Summary     Normal left ventricle size, wall thickness and systolic function with an EF   of 55%     Although not well visualized, there is possible mild hyopkinesis of the   apicoseptal wall     Diastolic filling parameters suggest grade I diastolic dysfunction. E/e'=9.7. Trivial tricuspid regurgitation with RVSP of 31 mmHg. Cath: Anatomy:   LM-normal  LAD-patent proximal stent, 100% mid  Cx-20% proximal and mid  OM1- patent  RCA-10% distal diffuse, codominant  RPDA- patent  LVEF- 40% with mid to distal anterior hypokinesis  PCI: % to 0% with a 2.75 mm x 16 mm [employer] Synergy INNA.          Medication:  Current Outpatient Medications   Medication Sig Dispense Refill    pantoprazole (PROTONIX) 40 MG tablet Take 1 tablet by mouth every morning (before breakfast) 30 tablet 3    prasugrel (EFFIENT) 10 MG TABS Take 1 tablet by mouth daily 90 tablet 1    aspirin 81 MG chewable tablet Take 1 tablet by mouth daily 90 tablet 3    atorvastatin (LIPITOR) 40 MG tablet Take 1 tablet by mouth nightly 90 tablet 3    lisinopril (PRINIVIL;ZESTRIL) 5 MG tablet Take 1 tablet by mouth daily 90 tablet 3    vitamin C (ASCORBIC ACID) 500 MG tablet Take 500 mg by mouth daily      vitamin D (CHOLECALCIFEROL) 1000 UNIT TABS tablet Take 1,000 Units by mouth daily      vitamin B-12 (CYANOCOBALAMIN) 1000 MCG tablet Take 1,000 mcg by mouth daily      Omega-3 Fatty Acids (FISH OIL) 1000 MG CAPS Take 1,000 mg by mouth daily      Garlic 6235 MG CAPS Take 1 capsule by mouth daily       nitroGLYCERIN (NITROSTAT) 0.4 MG SL tablet up to max of 3 total doses. If no relief after 1 dose, call 911. 25 tablet 3     No current facility-administered medications for this visit. 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 Sara Lee   Doing well post ablation. Denies cp/pressure, palpitations, dyspnea, syncope/presyncope or any cardiac complaints. .- Supraventricular tachycardia               s/p ablation as above              No recurrence       - HTN  BP Readings from Last 3 Encounters:   03/25/19 132/74   02/13/19 107/74   01/15/19 108/70               BP is well controlled. Continue current meds.       - HLD              On statin     - CAD   On prasugrel              No chest pain       1) No medication changes today    2) Follow up with Dr. Talita Hopson in 4 months for CAD to discuss prasugrel and if it's ok to stop it    3) Follow up with Dr. Sara Lee in 9-12 months    4) Try to take tylenol for head pressure if that doesn't help then please follow up with PCP      Maria Antonia Valdivia   Office: [phone]

## 2019-08-22 ENCOUNTER — OFFICE VISIT (OUTPATIENT)
Dept: CARDIOLOGY CLINIC | Age: 59
End: 2019-08-22
Payer: COMMERCIAL

## 2019-08-22 VITALS
DIASTOLIC BLOOD PRESSURE: 70 MMHG | SYSTOLIC BLOOD PRESSURE: 110 MMHG | OXYGEN SATURATION: 95 % | WEIGHT: 255 LBS | HEIGHT: 73 IN | BODY MASS INDEX: 33.8 KG/M2 | HEART RATE: 78 BPM

## 2019-08-22 DIAGNOSIS — E78.2 MIXED HYPERLIPIDEMIA: ICD-10-CM

## 2019-08-22 DIAGNOSIS — I25.10 CORONARY ARTERY DISEASE DUE TO LIPID RICH PLAQUE: Primary | ICD-10-CM

## 2019-08-22 DIAGNOSIS — R93.1 ABNORMAL ECHOCARDIOGRAM: ICD-10-CM

## 2019-08-22 DIAGNOSIS — I47.1 SVT (SUPRAVENTRICULAR TACHYCARDIA) (HCC): ICD-10-CM

## 2019-08-22 DIAGNOSIS — I21.4 NSTEMI (NON-ST ELEVATED MYOCARDIAL INFARCTION) (HCC): ICD-10-CM

## 2019-08-22 DIAGNOSIS — Z95.5 STATUS POST INSERTION OF DRUG-ELUTING STENT INTO LEFT ANTERIOR DESCENDING (LAD) ARTERY: ICD-10-CM

## 2019-08-22 DIAGNOSIS — I25.83 CORONARY ARTERY DISEASE DUE TO LIPID RICH PLAQUE: Primary | ICD-10-CM

## 2019-08-22 DIAGNOSIS — I10 BENIGN ESSENTIAL HTN: ICD-10-CM

## 2019-08-22 PROCEDURE — 99214 OFFICE O/P EST MOD 30 MIN: CPT | Performed by: INTERNAL MEDICINE

## 2019-08-22 RX ORDER — LISINOPRIL 2.5 MG/1
2.5 TABLET ORAL DAILY
Qty: 90 TABLET | Refills: 3 | Status: SHIPPED | OUTPATIENT
Start: 2019-08-22 | End: 2020-04-17 | Stop reason: ALTCHOICE

## 2019-08-22 NOTE — PROGRESS NOTES
[address]    2019    Sapna Cavanaugh [id number] (:  1960) is a 60 y.o. male who is here for management of coronary artery disease, hypertension, and hyperlipidemia. Referring Provider: No primary care provider on file. HISTORY:  Mr. Ericka Cavanaugh 89Kelli has a history of coronary artery disease with PCI and stent placement in . Additional history includes hypertension and hyperlipidemia. He was hospitalized at [facility] on 18 with mid-sternal left sided chest pressure associated with shortness of breath, s/p NSTEMI. He was not taking any medications at the time of admission. On 18, he underwent LHC and occluded LAD was revascularized with INNA. He was discharged on ASA and Effient. He was hospitalized on 19 with melana and dizziness. He was found to be anemia, tachycardiac, and had low BP. EDG showed small gastric ulcer and erosive gastritis. He was started on Protonix and DAPT was resumed. He was admitted to the hospital in 2019 with heart racing and was found to be in SVT. He underwent AVNRT ablation by Dr. Adriana Braun on 19. Today, he is doing well from the cardiac standpoint. He denies exertional chest pain, shortness of breath, dizziness, edema. He denies further heart racing. He checks his BP periodically, SBP averages upper 's, DBP 60-70's. He does stretching exercises and bikes or jogs on occasion. He works six days a week in maintenance and denies any change in activity tolerance. He denies further melana. He has bruising on Effient and ASA, but he is tolerating other medications without side effects. REVIEW OF SYSTEMS:  A complete review of systems was reviewed and is negative except as noted in the history of present illness. Prior to Visit Medications    Medication Sig Taking?  Authorizing Provider   lisinopril (PRINIVIL;ZESTRIL) 2.5 MG tablet Take 1 tablet by mouth daily Yes Heaven Galdamez MD   pantoprazole (PROTONIX) 40 MG

## 2019-09-16 ENCOUNTER — HOSPITAL ENCOUNTER (OUTPATIENT)
Age: 59
Discharge: HOME | End: 2019-09-16
Payer: COMMERCIAL

## 2019-09-16 ENCOUNTER — HOSPITAL ENCOUNTER (OUTPATIENT)
Dept: NON INVASIVE DIAGNOSTICS | Age: 59
Discharge: HOME | End: 2019-09-16
Payer: COMMERCIAL

## 2019-09-16 DIAGNOSIS — I25.10 CORONARY ARTERY DISEASE DUE TO LIPID RICH PLAQUE: ICD-10-CM

## 2019-09-16 DIAGNOSIS — E78.2 MIXED HYPERLIPIDEMIA: ICD-10-CM

## 2019-09-16 DIAGNOSIS — I10 BENIGN ESSENTIAL HTN: ICD-10-CM

## 2019-09-16 DIAGNOSIS — R93.1 ABNORMAL ECHOCARDIOGRAM: ICD-10-CM

## 2019-09-16 DIAGNOSIS — I25.83 CORONARY ARTERY DISEASE DUE TO LIPID RICH PLAQUE: ICD-10-CM

## 2019-09-16 LAB
ALT SERPL-CCNC: 15 U/L (ref 10–40)
ANION GAP SERPL CALCULATED.3IONS-SCNC: 14 MMOL/L (ref 3–16)
AST SERPL-CCNC: 15 U/L (ref 15–37)
BUN BLDV-MCNC: 19 MG/DL (ref 7–20)
CALCIUM SERPL-MCNC: 9.4 MG/DL (ref 8.3–10.6)
CHLORIDE BLD-SCNC: 103 MMOL/L (ref 99–110)
CHOLESTEROL, TOTAL: 138 MG/DL (ref 0–199)
CO2: 25 MMOL/L (ref 21–32)
CREAT SERPL-MCNC: 0.7 MG/DL (ref 0.9–1.3)
GFR AFRICAN AMERICAN: >60
GFR NON-AFRICAN AMERICAN: >60
GLUCOSE BLD-MCNC: 107 MG/DL (ref 70–99)
HDLC SERPL-MCNC: 56 MG/DL (ref 40–60)
LDL CHOLESTEROL CALCULATED: 63 MG/DL
LEFT VENTRICULAR EJECTION FRACTION MODE: NORMAL
LV EF: 55 %
LV EF: 55 %
LVEF MODALITY: NORMAL
POTASSIUM SERPL-SCNC: 4.6 MMOL/L (ref 3.5–5.1)
SODIUM BLD-SCNC: 142 MMOL/L (ref 136–145)
TRIGL SERPL-MCNC: 95 MG/DL (ref 0–150)
VLDLC SERPL CALC-MCNC: 19 MG/DL

## 2019-09-16 PROCEDURE — 80048 BASIC METABOLIC PNL TOTAL CA: CPT

## 2019-09-16 PROCEDURE — 84460 ALANINE AMINO (ALT) (SGPT): CPT

## 2019-09-16 PROCEDURE — 93306 TTE W/DOPPLER COMPLETE: CPT

## 2019-09-16 PROCEDURE — 80061 LIPID PANEL: CPT

## 2019-09-16 PROCEDURE — 84450 TRANSFERASE (AST) (SGOT): CPT

## 2019-09-16 PROCEDURE — 36415 COLL VENOUS BLD VENIPUNCTURE: CPT

## 2019-09-17 ENCOUNTER — TELEPHONE (OUTPATIENT)
Dept: CARDIOLOGY CLINIC | Age: 59
End: 2019-09-17

## 2019-09-18 ENCOUNTER — TELEPHONE (OUTPATIENT)
Dept: CARDIOLOGY CLINIC | Age: 59
End: 2019-09-18

## 2019-09-18 RX ORDER — ATORVASTATIN CALCIUM 40 MG/1
40 TABLET, FILM COATED ORAL NIGHTLY
Qty: 90 TABLET | Refills: 3 | Status: SHIPPED | OUTPATIENT
Start: 2019-09-18 | End: 2020-07-27 | Stop reason: SDUPTHER

## 2019-09-19 ENCOUNTER — TELEPHONE (OUTPATIENT)
Dept: CARDIOLOGY CLINIC | Age: 59
End: 2019-09-19

## 2020-01-24 ENCOUNTER — HOSPITAL ENCOUNTER (EMERGENCY)
Age: 60
Discharge: HOME | End: 2020-01-24
Payer: COMMERCIAL

## 2020-01-24 VITALS
OXYGEN SATURATION: 96 % | DIASTOLIC BLOOD PRESSURE: 87 MMHG | WEIGHT: 249.25 LBS | HEIGHT: 73 IN | TEMPERATURE: 99.1 F | RESPIRATION RATE: 16 BRPM | SYSTOLIC BLOOD PRESSURE: 130 MMHG | HEART RATE: 103 BPM | BODY MASS INDEX: 33.03 KG/M2

## 2020-01-24 LAB
RAPID INFLUENZA  B AGN: NEGATIVE
RAPID INFLUENZA A AGN: POSITIVE

## 2020-01-24 PROCEDURE — 99283 EMERGENCY DEPT VISIT LOW MDM: CPT

## 2020-01-24 PROCEDURE — 87804 INFLUENZA ASSAY W/OPTIC: CPT

## 2020-01-24 PROCEDURE — 6370000000 HC RX 637 (ALT 250 FOR IP): Performed by: PHYSICIAN ASSISTANT

## 2020-01-24 RX ORDER — IBUPROFEN 800 MG/1
800 TABLET ORAL ONCE
Status: COMPLETED | OUTPATIENT
Start: 2020-01-24 | End: 2020-01-24

## 2020-01-24 RX ORDER — IBUPROFEN 800 MG/1
800 TABLET ORAL EVERY 8 HOURS PRN
Qty: 30 TABLET | Refills: 0 | Status: SHIPPED | OUTPATIENT
Start: 2020-01-24 | End: 2020-04-17

## 2020-01-24 RX ORDER — OSELTAMIVIR PHOSPHATE 75 MG/1
75 CAPSULE ORAL 2 TIMES DAILY
Qty: 10 CAPSULE | Refills: 0 | Status: SHIPPED | OUTPATIENT
Start: 2020-01-24 | End: 2020-01-29

## 2020-01-24 RX ORDER — OSELTAMIVIR PHOSPHATE 75 MG/1
75 CAPSULE ORAL ONCE
Status: COMPLETED | OUTPATIENT
Start: 2020-01-24 | End: 2020-01-24

## 2020-01-24 RX ADMIN — OSELTAMIVIR PHOSPHATE 75 MG: 75 CAPSULE ORAL at 19:08

## 2020-01-24 RX ADMIN — IBUPROFEN 800 MG: 800 TABLET, FILM COATED ORAL at 17:54

## 2020-01-24 ASSESSMENT — PAIN DESCRIPTION - PAIN TYPE: TYPE: ACUTE PAIN

## 2020-01-24 ASSESSMENT — PAIN DESCRIPTION - LOCATION: LOCATION: KNEE

## 2020-01-24 ASSESSMENT — ENCOUNTER SYMPTOMS
ABDOMINAL PAIN: 0
NAUSEA: 0
DIARRHEA: 0
WHEEZING: 0
SHORTNESS OF BREATH: 0
VOMITING: 0
RHINORRHEA: 0
COUGH: 1

## 2020-01-24 ASSESSMENT — PAIN SCALES - GENERAL: PAINLEVEL_OUTOF10: 9

## 2020-01-24 ASSESSMENT — PAIN DESCRIPTION - ORIENTATION: ORIENTATION: LEFT;RIGHT

## 2020-01-25 NOTE — ED NOTES
Pt discharged in stable condition, VSS, no signs of distress. Discharge instructions and meds reviewed. Pt verbalizes understanding and states no further questions or concerns unaddressed.        Maris Cason RN  01/24/20 1919

## 2020-01-25 NOTE — ED PROVIDER NOTES
[address] [facility]        Pt Name: Grecia Gilmore  MRN: [id number]  [place] 1960  Date of evaluation: 1/24/2020  Provider: Vangie Bhatt PA-C  PCP: No primary care provider on file. This patient was not seen and evaluated by the attending physician No att. providers found. CHIEF COMPLAINT       Chief Complaint   Patient presents with    Generalized Body Aches     Pt. comes in today with generalized body aches that have been going on since yesterday morning. Pt. reports that he has had fever and chills at home. HISTORY OF PRESENT ILLNESS   (Location/Symptom, Timing/Onset, Context/Setting, Quality, Duration, Modifying Factors, Severity)  Note limiting factors. Grecia Gilmore is a 60 y.o. male who presents for evaluation of generalized body aches and fever that started yesterday. He states that he has had a cough for the past 4 days. No congestion. No chest pain or shortness of breath. No abdominal pain, nausea vomiting or diarrhea. He states that all of his bones hurt. No rashes. No known sick contacts with similar symptoms. No dizziness/lightheadedness, weakness, visual disturbances or syncope. No significant headaches. No neck pain or stiffness. He has no other complaints or concerns at this time. Nursing Notes were all reviewed and agreed with or any disagreements were addressed in the HPI. REVIEW OF SYSTEMS    (2-9 systems for level 4, 10 or more for level 5)     Review of Systems   Constitutional: Positive for fever. Negative for appetite change and chills. HENT: Negative for congestion and rhinorrhea. Eyes: Negative for visual disturbance. Respiratory: Positive for cough. Negative for shortness of breath and wheezing. Cardiovascular: Negative for chest pain. Gastrointestinal: Negative for abdominal pain, diarrhea, nausea and vomiting.    Genitourinary: Negative for difficulty tablet up to max of 3 total doses. If no relief after 1 dose, call 911., Disp-25 tablet, R-3Print               ALLERGIES     Patient has no known allergies. FAMILYHISTORY     History reviewed. No pertinent family history. SOCIAL HISTORY       Social History     Tobacco Use    Smoking status: Never Smoker    Smokeless tobacco: Never Used   Substance Use Topics    Alcohol use: No    Drug use: No       SCREENINGS             PHYSICAL EXAM    (up to 7 for level 4, 8 or more for level 5)     ED Triage Vitals [01/24/20 1732]   BP Temp Temp Source Pulse Resp SpO2 Height Weight   130/87 99.1 °F (37.3 °C) Oral 103 16 96 % 6' 1\" (1.854 m) 249 lb 4 oz (113.1 kg)       Physical Exam  Vitals signs and nursing note reviewed. Constitutional:       Appearance: He is well-developed. He is not diaphoretic. HENT:      Head: Normocephalic and atraumatic. Right Ear: External ear normal.      Left Ear: External ear normal.      Nose: Nose normal. No congestion. Mouth/Throat:      Mouth: Mucous membranes are moist.      Pharynx: Oropharynx is clear. No oropharyngeal exudate or posterior oropharyngeal erythema. Eyes:      General:         Right eye: No discharge. Left eye: No discharge. Conjunctiva/sclera: Conjunctivae normal.      Pupils: Pupils are equal, round, and reactive to light. Neck:      Musculoskeletal: Normal range of motion and neck supple. Cardiovascular:      Rate and Rhythm: Regular rhythm. Tachycardia present. Heart sounds: Normal heart sounds. Pulmonary:      Effort: Pulmonary effort is normal. No respiratory distress. Breath sounds: Normal breath sounds. No wheezing, rhonchi or rales. Chest:      Chest wall: No tenderness. Abdominal:      General: There is no distension. Tenderness: There is no abdominal tenderness. There is no guarding or rebound. Musculoskeletal: Normal range of motion. Skin:     General: Skin is warm and dry.    Neurological: Mental Status: He is alert and oriented to person, place, and time. Psychiatric:         Behavior: Behavior normal.         DIAGNOSTIC RESULTS   LABS:    Labs Reviewed   RAPID INFLUENZA A/B ANTIGENS - Abnormal; Notable for the following components:       Result Value    Rapid Influenza A Ag POSITIVE (*)     All other components within normal limits    Narrative:     Performed at:  [facility] [address], [facility], [address]   Phone [phone]       All other labs were within normal range or not returned as of this dictation. EKG: All EKG's are interpreted by the Emergency Department Physician in the absence of a cardiologist.  Please see their note for interpretation of EKG. RADIOLOGY:   Non-plain film images such as CT, Ultrasound and MRI are read by the radiologist. Plain radiographic images are visualized and preliminarily interpreted by the  ED Provider with the below findings:        Interpretation per the Radiologist below, if available at the time of this note:    No orders to display     No results found. PROCEDURES   Unless otherwise noted below, none     Procedures    CRITICAL CARE TIME   N/A    CONSULTS:  None      EMERGENCY DEPARTMENT COURSE and DIFFERENTIAL DIAGNOSIS/MDM:   Vitals:    Vitals:    01/24/20 1732   BP: 130/87   Pulse: 103   Resp: 16   Temp: 99.1 °F (37.3 °C)   TempSrc: Oral   SpO2: 96%   Weight: 249 lb 4 oz (113.1 kg)   Height: 6' 1\" (1.854 m)       Patient was given thefollowing medications:  Medications   ibuprofen (ADVIL;MOTRIN) tablet 800 mg (800 mg Oral Given 1/24/20 1754)   oseltamivir (TAMIFLU) capsule 75 mg (75 mg Oral Given 1/24/20 1908)       Patient presents for evaluation of generalized body aches and fever. On exam, he is resting comfortably in bed no acute distress and nontoxic. Vitals are stable and he is afebrile. Lungs are clear to auscultation bilaterally, chest is nontender and abdomen is benign.   HEENT exam

## 2020-04-17 ENCOUNTER — OFFICE VISIT (OUTPATIENT)
Dept: CARDIOLOGY CLINIC | Age: 60
End: 2020-04-17
Payer: COMMERCIAL

## 2020-04-17 ENCOUNTER — TELEPHONE (OUTPATIENT)
Dept: CARDIOLOGY CLINIC | Age: 60
End: 2020-04-17

## 2020-04-17 VITALS
HEART RATE: 86 BPM | BODY MASS INDEX: 34.99 KG/M2 | RESPIRATION RATE: 18 BRPM | HEIGHT: 73 IN | OXYGEN SATURATION: 98 % | WEIGHT: 264 LBS | SYSTOLIC BLOOD PRESSURE: 122 MMHG | DIASTOLIC BLOOD PRESSURE: 70 MMHG

## 2020-04-17 PROCEDURE — 99214 OFFICE O/P EST MOD 30 MIN: CPT | Performed by: NURSE PRACTITIONER

## 2020-04-17 RX ORDER — CARVEDILOL 3.12 MG/1
3.12 TABLET ORAL 2 TIMES DAILY
Qty: 60 TABLET | Refills: 0 | Status: SHIPPED | OUTPATIENT
Start: 2020-04-17 | End: 2020-05-04 | Stop reason: SDUPTHER

## 2020-04-17 RX ORDER — CARVEDILOL 3.12 MG/1
3.12 TABLET ORAL DAILY
Qty: 30 TABLET | Refills: 0 | Status: SHIPPED | OUTPATIENT
Start: 2020-04-17 | End: 2020-04-17

## 2020-04-17 RX ORDER — METOPROLOL SUCCINATE 25 MG/1
25 TABLET, EXTENDED RELEASE ORAL DAILY
Qty: 30 TABLET | Refills: 3 | Status: SHIPPED
Start: 2020-04-17 | End: 2020-04-17 | Stop reason: ALTCHOICE

## 2020-04-17 NOTE — PATIENT INSTRUCTIONS
Stop lisinopril     Start taking Coreg 3.125mg twice a day      Referral to sleep medicine doctor for a sleep study     Follow up in 1-2 weeks

## 2020-04-17 NOTE — PROGRESS NOTES
normal  · The carotid upstroke is normal in amplitude and contour without delay or bruit  · Apical impulse is not displaced  · Normal S1, S2. No S3. No Murmur  · No edema  · Pedal Pulses: 2+ and equal   Abdomen:  · No masses or tenderness  · Liver/Spleen: No Abnormalities Noted  Musculoskeletal:  · Fingers without clubbing or cyanosis  · Normal Gait  Skin:  · No rash  · Normal skin turgor   Neurologic/Psychiatric:  · Alert and oriented in all spheres  · Normal mood and affect  · Memory and mentation intact           Lab Results   Component Value Date    CHOL 138 09/16/2019    TRIG 95 09/16/2019    HDL 56 09/16/2019    LDLCALC 63 09/16/2019     Lab Results   Component Value Date     09/16/2019    K 4.6 09/16/2019    K 4.3 02/12/2019     09/16/2019    CO2 25 09/16/2019    GLUCOSE 107 09/16/2019    BUN 19 09/16/2019    CREATININE 0.7 09/16/2019    CALCIUM 9.4 09/16/2019    GFRAA >60 09/16/2019     Lab Results   Component Value Date    ALT 15 09/16/2019    AST 15 09/16/2019     ECHO 9/16/19:  Summary   -Normal left ventricle size, moderate wall thickness and normal systolic   function with an estimated ejection fraction of 55%.   -No regional wall motion abnormalities are seen. -Grade I diastolic dysfunction with normal LV filling pressures. E/e\"=10.55.   -Mild mitral regurgitation.   -Trivial tricuspid regurgitation.   -Estimated pulmonary artery systolic pressure is normal at 16-21 mmHg   assuming a right atrial pressure of 3 mmHg.     ECHO: 7/9/18  Normal left ventricle size, wall thickness and systolic function with an EF   of 55%   Although not well visualized, there is possible mild hyopkinesis of the   apicoseptal wall   Diastolic filling parameters suggest grade I diastolic dysfunction.   P/V'=3.4.   Trivial tricuspid regurgitation with RVSP of 31 mmHg.     Cardiac Cath 7/9/18:  Anatomy:   LM-normal  LAD-patent proximal stent, 100% mid  Cx-20% proximal and mid  OM1- patent  RCA-10% distal diffuse,

## 2020-04-17 NOTE — TELEPHONE ENCOUNTER
Pt needs to be sched in 2 mo with [facility]. Please call and schedule when [facility] has a sched. Please call pt to advise. Ok to leave message per pt.

## 2020-04-17 NOTE — PROGRESS NOTES
insomnia or depression    Objective:   PHYSICAL EXAM:       Vitals:    04/17/20 1405 04/17/20 1434   BP: 124/66 122/70   Site: Left Upper Arm    Position: Sitting    Cuff Size: Large Adult    Pulse: 86    Resp: 18    SpO2: 98%    Weight: 264 lb (119.7 kg)    Height: 6' 1\" (1.854 m)       VITALS:  /70   Pulse 86   Resp 18   Ht 6' 1\" (1.854 m)   Wt 264 lb (119.7 kg)   SpO2 98%   BMI 34.83 kg/m²   CONSTITUTIONAL: Cooperative, no apparent distress, and appears well nourished / developed  NEUROLOGIC:  Awake and orientated to person, place and time. PSYCH: Calm affect. SKIN: Warm and dry. HEENT: Sclera non-icteric, normocephalic, neck supple, no elevation of JVP, normal carotid pulses with no bruits and thyroid normal size. LUNGS:  No increased work of breathing and clear to auscultation, no crackles or wheezing  CARDIOVASCULAR:  Regular rate 88 and rhythm with no murmurs, gallops, rubs, or abnormal heart sounds, normal PMI. The apical impulses not displaced  JVP less than 8 cm H2O  Heart tones are crisp and normal  Cervical veins are not engorged  The carotid upstroke is normal in amplitude and contour without delay or bruit  JVP is not elevated  ABDOMEN:  Normal bowel sounds, non-distended and non-tender to palpation  EXT: No edema, no calf tenderness.      DATA:    Lab Results   Component Value Date    ALT 15 09/16/2019    AST 15 09/16/2019    ALKPHOS 50 02/11/2019    BILITOT 0.6 02/11/2019     Lab Results   Component Value Date    CREATININE 0.7 (L) 09/16/2019    BUN 19 09/16/2019     09/16/2019    K 4.6 09/16/2019     09/16/2019    CO2 25 09/16/2019     No results found for: TSH, Z8SXMMS, Q2QZJDI, THYROIDAB  Lab Results   Component Value Date    WBC 7.7 02/12/2019    HGB 11.5 (L) 02/12/2019    HCT 34.5 (L) 02/12/2019    MCV 86.3 02/12/2019     02/12/2019     No components found for: CHLPL  Lab Results   Component Value Date    TRIG 95 09/16/2019    TRIG 172 (H) 07/10/2018     Lab essential hypertension   ~Controlled at 122/70  ~lisinoprol 2.5mg    3. Mixed hyperlipidemia   ~Controlled LDL 63 (9/2019)  ~LFTs WNL   ~A1C 5.8 in 2011  ~lipitor 40mg and fish oil      4. SVT  ~stable ; no c/o palpitations   ~S/p AVNRT ablation on 2/12/20    5. Snoring   ~possible undiagnosed sleep apnea     I had the opportunity to review the clinical symptoms and presentation of Sapna Barnett Mao [age]. Plan:     1. Stop lisinopril and start Coreg 3.125 mg BID  2. Referral to sleep medicine for sleep study   3. Follow up in 1-2 weeks     Overall the patient is stable from CV standpoint    I have addresed the patient's cardiac risk factors and adjusted pharmacologic treatment as needed. In addition, I have reinforced the need for patient directed risk factor modification. Further evaluation will be based upon the patient's clinical course and testing results. All questions and concerns were addressed to the patient. Alternatives to my treatment were discussed. The patient is not currently smoking. The risks related to smoking were reviewed with the patient. Recommend maintaining a smoke-free lifestyle. Patient is on a beta-blocker  Patient is not on an ace-i/ARB: neg chf   Patient is on a statin     Antiplatelet therapy has been recommended / prescribed for this patient. Education conducted on adverse reactions including bleeding was discussed. The patient verbalizes understanding not to stop medications without discussing with us. Discussed exercise: 30-60 minutes 7 days/week  Discussed Low saturated fat diet. Thank you for allowing to us to participate in the care of [address] [facility] [address]     Sally Lazo, ADAMA     Documentation of today's visit sent to PCP

## 2020-04-20 ENCOUNTER — TELEPHONE (OUTPATIENT)
Dept: CARDIOLOGY CLINIC | Age: 60
End: 2020-04-20

## 2020-05-04 ENCOUNTER — OFFICE VISIT (OUTPATIENT)
Dept: CARDIOLOGY CLINIC | Age: 60
End: 2020-05-04
Payer: COMMERCIAL

## 2020-05-04 ENCOUNTER — TELEPHONE (OUTPATIENT)
Dept: CARDIOLOGY CLINIC | Age: 60
End: 2020-05-04

## 2020-05-04 VITALS
WEIGHT: 265.6 LBS | SYSTOLIC BLOOD PRESSURE: 108 MMHG | BODY MASS INDEX: 35.2 KG/M2 | HEIGHT: 73 IN | HEART RATE: 82 BPM | DIASTOLIC BLOOD PRESSURE: 70 MMHG | OXYGEN SATURATION: 95 %

## 2020-05-04 PROCEDURE — 99214 OFFICE O/P EST MOD 30 MIN: CPT | Performed by: NURSE PRACTITIONER

## 2020-05-04 RX ORDER — PANTOPRAZOLE SODIUM 40 MG/1
40 TABLET, DELAYED RELEASE ORAL
Qty: 30 TABLET | Refills: 0 | Status: SHIPPED | OUTPATIENT
Start: 2020-05-04 | End: 2020-07-27 | Stop reason: SDUPTHER

## 2020-05-04 RX ORDER — CARVEDILOL 3.12 MG/1
3.12 TABLET ORAL 2 TIMES DAILY
Qty: 180 TABLET | Refills: 3 | Status: SHIPPED | OUTPATIENT
Start: 2020-05-04 | End: 2020-07-27 | Stop reason: SDUPTHER

## 2020-05-04 NOTE — PROGRESS NOTES
Results   Component Value Date    LDLCALC 63 09/16/2019    LDLCALC 102 (H) 07/10/2018     Lab Results   Component Value Date    LABVLDL 19 09/16/2019    LABVLDL 34 07/10/2018     Radiology Review:  Pertinent images / reports were reviewed as a part of this visit and reveals the following:    Last Echo: 9/16/2019  Summary   -Normal left ventricle size, moderate wall thickness and normal systolic   function with an estimated ejection fraction of 55%.   -No regional wall motion abnormalities are seen. -Grade I diastolic dysfunction with normal LV filling pressures. E/e\"=10.55.   -Mild mitral regurgitation.   -Trivial tricuspid regurgitation.   -Estimated pulmonary artery systolic pressure is normal at 16-21 mmHg   assuming a right atrial pressure of 3 mmHg. Last Stress Test: 7/23/2018  Summary   Normal treadmill exercise test.   Poor exercise tolerance. Cardiac Cath 7/9/18:  Anatomy:   LM-normal  LAD-patent proximal stent, 100% mid  Cx-20% proximal and mid  OM1- patent  RCA-10% distal diffuse, codominant  RPDA- patent  LVEF- 40% with mid to distal anterior hypokinesis  PCI: % to 0% with a 2.75 mm x 16 mm [place] [employer] INNA. Impression:  1.  Critically occluded mid LAD with successful revascularization using INNA. 2.  Mild to moderate LV systolic dysfunction. Plan:  1.  Aspirin 81 mg daily indefinitely  2.  Effient/equivalent for minimum of 1 year. 3.  Aspirin 81 mg daily, Effient 10 mg daily, Lipitor 40 mg nightly, Toprol-XL 25 mg daily, lisinopril 5 mg by mouth daily.  Compliance with medical regimen discussed in detail with patient and daughter pre-and postprocedure. Assessment:     1 Coronary artery disease due to lipid rich plaque   ~stable; some chest discomfort that he thinks has improved since starting coreg   ~NSTEMI, s/p PCI to proximal LAD with INNA in 1/2011  ~NSTEMI, s/p PTCA-INNA to mid LAD on 7/9/18  ~Normal stress test 7/23/18  ~ASA/ Statin/ low dose coreg      2.  Benign

## 2020-07-27 ENCOUNTER — TELEPHONE (OUTPATIENT)
Dept: CARDIOLOGY CLINIC | Age: 60
End: 2020-07-27

## 2020-07-27 RX ORDER — ATORVASTATIN CALCIUM 40 MG/1
40 TABLET, FILM COATED ORAL NIGHTLY
Qty: 90 TABLET | Refills: 0 | Status: SHIPPED | OUTPATIENT
Start: 2020-07-27 | End: 2020-10-12

## 2020-07-27 RX ORDER — CARVEDILOL 3.12 MG/1
3.12 TABLET ORAL 2 TIMES DAILY
Qty: 180 TABLET | Refills: 3 | Status: SHIPPED | OUTPATIENT
Start: 2020-07-27

## 2020-07-27 RX ORDER — PANTOPRAZOLE SODIUM 40 MG/1
40 TABLET, DELAYED RELEASE ORAL
Qty: 30 TABLET | Refills: 0 | Status: SHIPPED | OUTPATIENT
Start: 2020-07-27 | End: 2020-12-09

## 2020-07-27 NOTE — TELEPHONE ENCOUNTER
I will refill Protonix for 30 days. Any additional refills needs to come from PCP. Please let pt know.

## 2020-07-27 NOTE — TELEPHONE ENCOUNTER
Patient stopped by the office for refills on:  Atorvastatin, carvedilol and pantoprazole.   Please send to [address] [place] [address] [phone] Mary November [phone]   Thanks

## 2020-10-09 ENCOUNTER — TELEPHONE (OUTPATIENT)
Dept: CARDIOLOGY CLINIC | Age: 60
End: 2020-10-09

## 2020-10-09 NOTE — TELEPHONE ENCOUNTER
Medication needing refilled:   carvedilol (COREG) 3.125 MG   aspirin 81 MG chewable tablet   30 day    Which Pharmacy are we sending the medication to?:  [address] [place] [address], [address] [place] [address]    pls call and advise thank you

## 2020-10-19 ENCOUNTER — OFFICE VISIT (OUTPATIENT)
Dept: PULMONOLOGY | Age: 60
End: 2020-10-19
Payer: COMMERCIAL

## 2020-10-19 VITALS — SYSTOLIC BLOOD PRESSURE: 118 MMHG | DIASTOLIC BLOOD PRESSURE: 64 MMHG | HEART RATE: 78 BPM

## 2020-10-19 PROCEDURE — 99204 OFFICE O/P NEW MOD 45 MIN: CPT | Performed by: INTERNAL MEDICINE

## 2020-10-19 ASSESSMENT — SLEEP AND FATIGUE QUESTIONNAIRES: NECK CIRCUMFERENCE (INCHES): 18

## 2020-10-19 NOTE — PROGRESS NOTES
PULMONARY OFFICE NEW PATIENT VISIT    CONSULTING PHYSICIAN:   ADAMA Dimas*     REASON FOR VISIT:   Chief Complaint   Patient presents with    Sleep Apnea     referred by Cardiology due to snoring       DATE OF VISIT: 10/19/2020    HISTORY OF PRESENT ILLNESS: 60y.o. year old male to the pulmonary/sleep clinic for the first time. Patient reports that he has had witnessed snoring, gasping, choking at nighttime. His sleep quality is disturbed and he frequently wakes up. Denies daytime sleepiness. Does have a.m. dry mouth but denies any a.m. headache. Has a history of coronary artery disease requiring 2 PCI in the past.  Weight has been stable. No shortness of breath, cough, wheezing, orthopnea or. Paroxysmal nocturnal dyspnea      Sleep Medicine 10/19/2020   Neck circumference 18         STOP-BANG Questionnaire    Snore Loudly Yes   Often feel Tired/Fatigued/Sleepy Yes   Observed breathing pauses Yes   Blood pressure problems Yes   BMI >35 Kg/m2 35.04     Age>50 60 y.o. Neck Circumference>16 inches Neck circumference: 25    Gender Male? male     Total 8       Duration  Associated Signs/Symptoms  Timing  Location        REVIEW OF SYSTEMS:   CONSTITUTIONAL SYMPTOMS: The patient denies fever, fatigue, night sweats, weight loss or weight gain. HEENT: No vision changes. No tinnitus, Denies sinus pain. No hoarseness, or dysphagia. Mallampati class I airways. NECK: Patient denies swelling in the neck. CARDIOVASCULAR: Denies chest pain, palpitation, syncope. RESPIRATORY: Denies shortness of breath or cough. GASTROINTESTINAL: Denies nausea, abdominal pain or change in bowel function. GENITOURINARY: Denies obstructive symptoms. No history of incontinence. BREASTS: No masses or lumps in the breasts. SKIN: No rashes or itching. MUSCULOSKELETAL: Denies weakness or bone pain. NEUROLOGICAL: No headaches or seizures. PSYCHIATRIC: Denies mood swings or depression.    ENDOCRINE: Denies heat or cold intolerance or excessive thirst.  HEMATOLOGIC/LYMPHATIC: Denies easy bruising or lymph node swelling. ALLERGIC/IMMUNOLOGIC: No environmental allergies. PAST MEDICAL HISTORY:   Past Medical History:   Diagnosis Date    CAD (coronary artery disease)     Hyperlipidemia     Hypertension        PAST SURGICAL HISTORY:   Past Surgical History:   Procedure Laterality Date    CORONARY ANGIOPLASTY WITH STENT PLACEMENT  2011    UPPER GASTROINTESTINAL ENDOSCOPY N/A 1/15/2019    EGD BIOPSY performed by Abdiel De La Paz MD at [address] [place] [address] N/A 1/15/2019    EGD CONTROL HEMORRHAGE performed by Abdiel De La Paz MD at [facility]:   Social History     Tobacco Use    Smoking status: Never Smoker    Smokeless tobacco: Never Used   Substance Use Topics    Alcohol use: No    Drug use: No       FAMILY HISTORY: History reviewed. No pertinent family history. MEDICATIONS:     Current Outpatient Medications on File Prior to Visit   Medication Sig Dispense Refill    atorvastatin (LIPITOR) 40 MG tablet Take 1 tablet by mouth nightly 60 tablet 0    carvedilol (COREG) 3.125 MG tablet Take 1 tablet by mouth 2 times daily 180 tablet 3    pantoprazole (PROTONIX) 40 MG tablet Take 1 tablet by mouth every morning (before breakfast) 30 tablet 0    vitamin C (ASCORBIC ACID) 500 MG tablet Take 500 mg by mouth daily      vitamin D (CHOLECALCIFEROL) 1000 UNIT TABS tablet Take 1,000 Units by mouth daily      vitamin B-12 (CYANOCOBALAMIN) 1000 MCG tablet Take 1,000 mcg by mouth daily      Omega-3 Fatty Acids (FISH OIL) 1000 MG CAPS Take 1,000 mg by mouth daily      Garlic 7809 MG CAPS Take 1 capsule by mouth daily       aspirin 81 MG chewable tablet Take 1 tablet by mouth daily 90 tablet 3    nitroGLYCERIN (NITROSTAT) 0.4 MG SL tablet up to max of 3 total doses.  If no relief after 1 dose, call 911. 25 tablet 3     No current facility-administered medications on file prior to visit. ALLERGIES:   Allergies as of 10/19/2020    (No Known Allergies)      OBJECTIVE:   blood pressure is 118/64 and his pulse is 78. Neck circumference: 18    PHYSICAL EXAM:    CONSTITUTIONAL: He is a 60y.o.-year-old who appears his stated age. He is alert and oriented x 3 and in no acute distress. HEENT: PERRLA, EOMI. No scleral icterus. No thrush, atraumatic, normocephalic. NECK: Supple, without cervical or supraclavicular lymphadenopathy:  CARDIOVASCULAR: S1 S2 RRR. Without murmer  RESPIRATORY & CHEST: Lungs are clear to auscultation and percussion. No wheezing, no crackles. Good air movement  GASTROINTESTINAL & ABDOMEN: Soft, nontender, positive bowel sounds in all quadrants, non-distended, without hepatosplenomegaly. GENITOURINARY: Deferred. MUSCULOSKELETAL: No tenderness to palpation of the axial skeleton. There is no clubbing. No cyanosis. No edema of the lower extremities. SKIN OF BODY: No rash or jaundice. PSYCHIATRIC EVALUATION: Normal affect. Patient answers questions appropriately. HEMATOLOGIC/LYMPHATIC/ IMMUNOLOGIC: No palpable lymphadenopathy. NEUROLOGIC: Alert and oriented x 3. Groslly non-focal. Motor strength is 5+/5 in all muscle groups. The patient has a normal sensorium globally. LABS:    Lab Summary Latest Ref Rng & Units 9/16/2019 2/12/2019 2/11/2019   WBC 4.0 - 11.0 K/uL - 7.7 6.8   Hgb 13.5 - 17.5 g/dL - 11. 5(L) 11. 1(L)   Hct 40.5 - 52.5 % - 34. 5(L) 33. 3(L)   Platelets 254 - 979 K/uL - 201 210   Sodium 136 - 145 mmol/L 142 138 137   Potassium 3.5 - 5.1 mmol/L 4.6 4.3 4.4   BUN 7 - 20 mg/dL 19 9 13   Creatinine 0.9 - 1.3 mg/dL 0.7(L) 0.6(L) 0.7(L)   Glucose 70 - 99 mg/dL 107(H) 127(H) 113(H)   Calcium 8.3 - 10.6 mg/dL 9.4 8.9 8.8   Magnesium 1.80 - 2.40 mg/dL - - 2.00   Alk Phos 40 - 129 U/L - - 50   Albumin 3.4 - 5.0 g/dL - - 4.0   Bilirubin 0.0 - 1.0 mg/dL - - 0.6   AST 15 - 37 U/L 15 - 32   ALT 10 - 40 U/L 15 - 29   HDL cholesterol 40 - 60 mg/dL 56 - -   Triglycerides 0 - 150 mg/dL 95 - -   LDL calc <100 mg/dL 63 - -   VLDL calc Not Established mg/dL 19 - -   Some recent data might be hidden           IMAGING:    No new chest imaging for me to review. Pulmonary Functions Testing Results:          IMPRESSION AND RECOMMENDATIONS:     1. Obstructive sleep apnea syndrome  -Patient has several signs and symptoms which are suggestive of  sleep apnea. His stop bang questionnaire score is at its maximum of 8. High suspicion for obstructive sleep apnea. -Patient would prefer a home sleep study. If home sleep study is not able to make the diagnosis patient may need an in lab polysomnography.  - Home Sleep Study; Future    2. Class 2 severe obesity due to excess calories with serious comorbidity and body mass index (BMI) of 35.0 to 35.9 in adult [facility])  -I strongly advised the patient to make efforts to lose weight. I discussed various modalities including moderate intensity intermittent exercises, diet control and bariatric surgery. If the patient loses even 10 to 15% of current body weight, it will be beneficial in improving the overall health. 3. Coronary artery disease due to lipid rich plaque  -Management as per cardiology services. Return in about 1 month (around 11/19/2020). Felix Saint, MD  Pulmonary Critical Care and Sleep Medicine  10/19/2020, 3:24 PM    This note was completed using dragon medical speech recognition software. Grammatical errors, random word insertions, pronoun errors and incomplete sentences are occasional consequences of this technology due to software limitations. If there are questions or concerns about the content of this note of information contained within the body of this dictation they should be addressed with the provider for clarification.

## 2020-10-20 ENCOUNTER — TELEPHONE (OUTPATIENT)
Dept: SLEEP CENTER | Age: 60
End: 2020-10-20

## 2020-11-16 ENCOUNTER — HOSPITAL ENCOUNTER (OUTPATIENT)
Dept: PHYSICAL THERAPY | Age: 60
Setting detail: THERAPIES SERIES
Discharge: HOME | End: 2020-11-16
Payer: COMMERCIAL

## 2020-11-16 PROCEDURE — 97161 PT EVAL LOW COMPLEX 20 MIN: CPT

## 2020-11-16 PROCEDURE — 97530 THERAPEUTIC ACTIVITIES: CPT

## 2020-11-16 NOTE — PLAN OF CARE
[facility], [facility], [address]  Phone: [phone]   Fax: [phone]                                                     Physical Therapy Certification    Dear Referring Practitioner: Marilee Domingo MD,    We had the pleasure of evaluating the following patient for physical therapy services at [address] [place] [address]. A summary of our findings can be found in the initial assessment below. This includes our plan of care. If you have any questions or concerns regarding these findings, please do not hesitate to contact me at the office phone number checked above. Thank you for the referral.       Physician Signature:_______________________________Date:__________________  By signing above (or electronic signature), therapists plan is approved by physician      Patient: Jessica Hair   : 1960   MRN: [id number]  Referring Physician: Referring Practitioner: Marilee Domingo MD      Evaluation Date: 2020      Medical Diagnosis Information:  Diagnosis: M25.512 (ICD-10-CM) - Left shoulder pain   Treatment Diagnosis: Decreased ROM & strength of L shoulder following RTC tear and repair on 10/27/2020                                         Insurance information: PT Insurance Information: [employer] - Comp management. Claim #[id number];  DOI 10/07/2020. EJU#[phone]    Precautions/ Contra-indications: NKDA  Latex Allergy:  [x]NO      []YES  Preferred Language for Healthcare:   [x]English       []other:    SUBJECTIVE:   Patient reports hurting L shoulder while lifting and rotating a heavy object on 10/7/2020, supinated L hand & rotated shoulder externally resulting in sudden pain in L shoulder. Immediately went to recommended doctor's office for assessment who referred to ortho surgeon for additional assessment.   MRI showed full thickness tear of RTC and was able to get scheduled for surgery shortly afterwards. Had surgery 10/29/2020 and things have been going as expected since then. Typically doesn't have to take heavy pain medications and is sleeping pretty well through the night. Is anxious to return to work and reports light duty is available once cleared by doctor. Relevant Medical History: HTN, CAD  Functional Outcome: Quick DASH: raw score = 22; dysfunction = 25%    Pain Scale: 1-2/10  Easing factors:  Just relaxes, doesn't require pain medication regularly  Provocative factors: if attempts to reach or lift arm  Type: []Constant   [x]Intermittent  []Radiating []Localized []other:   Numbness/Tingling: N&T infrequently in 3-5 distal digits. 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 Living Status/Prior Level of Function: Prior to this injury / incident, pt was independent with ADLs and IADLs, able to perform all work duties unrestricted     Per MD note on 11/5: Three weeks status post a shoulder rotator cuff repair. Patient may start passive range of motion exercises of the shoulder only for the next 4 weeks. May then start a active assistive range of motion exercise program of the shoulder. Active range of motion exercises may start at 8 weeks. No     strength training to 3 months postop.  May perform active and passive range of motion exercises of the elbow wrist and hand.        OBJECTIVE:   Hand dominance: right    Palpation: guarded with each shoulder PROM movement    Functional Mobility/Transfers:  IND w/transfers, difficulty transitioning sit to/from supine    Posture: forward rounded shoulders    Bandages/Dressings/Incisions: no signs of infection       PROM AROM    L R L R   Cervical Flexion        Cervical Extension        Cervical Rotation       Cervical Side-bend       Shoulder Flexion  90 deg  [facility]   Shoulder Scaption 95 deg  [facility]   Shoulder External Rotation  30 deg  [facility]   Shoulder Internal Rotation  30 deg in LPP  Deferred WFL   Elbow Flexion    WNL    Elbow Extension    WNL    Pronation    WFL    Supination    WFL    Wrist Flexion    Min impaired    Wrist Extension    min impaired    Radial Deviation        Ulnar Deviation            Strength (0-5) Left Right    Shoulder Shrug (C4)     Shoulder Flex Deferred  5   Shoulder Abd (C5)  5   Shoulder ER  4   Shoulder IR  5   Biceps (C6)     Triceps (C7)     Lats     Middle Trap     Rhomboids     Lower Trap      Pronation      Supination      Wrist Flex (C7)     Wrist Ext (C6)     Wrist Radial Deviation     Wrist Ulnar Deviation                    Joint mobility: L shoulder joint   []Normal    [x]Hypo   []Hyper                                     [x] Patient history, allergies, meds reviewed. Medical chart reviewed. See intake form. Review Of Systems (ROS):  [x]Performed Review of systems (Integumentary, CardioPulmonary, Neurological) by intake and observation. Intake form has been scanned into medical record. Patient has been instructed to contact their primary care physician regarding ROS issues if not already being addressed at this time.       Co-morbidities/Complexities (which will affect course of rehabilitation):   []None           Arthritic conditions   []Rheumatoid arthritis (M05.9)  []Osteoarthritis (M19.91)   Cardiovascular conditions   [x]Hypertension (I10)  [x]Hyperlipidemia (E78.5)  []Angina pectoris (I20)  []Atherosclerosis (I70)   Musculoskeletal conditions   []Disc pathology   []Congenital spine pathologies   []Prior surgical intervention  []Osteoporosis (M81.8)  []Osteopenia (M85.8)   Endocrine conditions   []Hypothyroid (E03.9)  []Hyperthyroid Gastrointestinal conditions   []Constipation (O56.78)   Metabolic conditions   []Morbid obesity (E66.01)  []Diabetes type 1(E10.65) or 2 (E11.65)   []Neuropathy (G60.9)     Pulmonary conditions   []Asthma (J45)  []Coughing   []COPD (J44.9)   Psychological Disorders  []Anxiety (F41.9)  []Depression (F32.9)   []Other:   []Other:          Barriers to/and or personal factors that will affect rehab potential:              [x]Age  []Sex    []Smoker              [x]Motivation/                        []Co-Morbidities              []Cognitive Function, education/learning barriers              []Environmental, home barriers              [x]profession/work barriers  []past PT/medical experience  []other:     Falls Risk Assessment (30 days):   [x] Falls Risk assessed and no intervention required. [] Falls Risk assessed and Patient requires intervention due to being higher risk   TUG score (>12s at risk):     [] Falls education provided, including       ASSESSMENT:  Patient is a 61 yo male  who tore L RTC while lifting and rotating object 10/7/20. Had repair 10/27/20 and has been progressing well w/controlled levels of pain. Patient can benefit from PT services to guide through protocol to protect joint and progress ROM & stability.       Functional Impairments   [x]Noted spinal or UE joint hypomobility   []Noted spinal or UE joint hypermobility   [x]Decreased UE functional ROM   [x]Decreased UE functional strength   []Abnormal reflexes/sensation/myotomal/dermatomal deficits   [x]Decreased RC/scapular/core strength and neuromuscular control   []other:      Functional Activity Limitations (from functional questionnaire and intake)   []Reduced ability to tolerate prolonged functional positions   []Reduced ability or difficulty with changes of positions or transfers between positions   [x]Reduced ability to maintain good posture and demonstrate good body mechanics with sitting, bending, and lifting   [] Reduced ability or tolerance with driving and/or computer work   []Reduced ability to sleep   [x]Reduced ability to perform lifting, reaching, carrying tasks   [x]Reduced ability to tolerate impact through UE   [x]Reduced ability to reach behind back   [x]Reduced ability to  or hold objects   [x]Reduced ability to throw or toss an object   [x]other: work duties    Participation Restrictions   []Reduced participation in self care activities   [x]Reduced participation in home management activities   [x]Reduced participation in work activities   []Reduced participation in social activities. []Reduced participation in sport/recreation activities. Classification:   [x]Signs/symptoms consistent with post-surgical status including decreased ROM, strength and function.   []Signs/symptoms consistent with joint sprain/strain   []Signs/symptoms consistent with shoulder impingement   []Signs/symptoms consistent with shoulder/elbow/wrist tendinopathy   []Signs/symptoms consistent with Rotator cuff tear   []Signs/symptoms consistent with labral tear   [x]Signs/symptoms consistent with postural dysfunction    []Signs/symptoms consistent with Glenohumeral IR Deficit - <45 degrees   []Signs/symptoms consistent with facet dysfunction of cervical/thoracic spine    []Signs/symptoms consistent with pathology which may benefit from Dry needling     []other:     Prognosis/Rehab Potential:      []Excellent   [x]Good    []Fair   []Poor    Tolerance of evaluation/treatment:    []Excellent   [x]Good    []Fair   []Poor    Physical Therapy Evaluation Complexity Justification  [x] A history of present problem with:  [] no personal factors and/or comorbidities that impact the plan of care;  [x]1-2 personal factors and/or comorbidities that impact the plan of care  []3 personal factors and/or comorbidities that impact the plan of care  [x] An examination of body systems using standardized tests and measures addressing any of the following: body structures and functions (impairments), activity limitations, and/or participation restrictions;:  [x] a total of 1-2 or more elements   [] a total of 3 or more elements   [] a total of 4 or more elements   [x] A clinical presentation with:  [x] stable and/or uncomplicated characteristics   [] evolving clinical presentation with changing characteristics  [] unstable and unpredictable characteristics;   [x] Clinical decision making of [x] low, [] moderate, [] high complexity using standardized patient assessment instrument and/or measurable assessment of functional outcome. [x] EVAL (LOW) 08031 (typically 15 minutes face-to-face)  [] EVAL (MOD) 50941 (typically 30 minutes face-to-face)  [] EVAL (HIGH) 99253 (typically 45 minutes face-to-face)  [] RE-EVAL     PLAN:  Frequency/Duration:  2 days per week for 6 Weeks:  INTERVENTIONS:  [x] Therapeutic exercise including: strength training, ROM, for Upper extremity and core   [x]  NMR activation and proprioception for UE, scap and Core   [x] Manual therapy as indicated for shoulder, scapula and spine to include: Dry Needling/IASTM, STM, PROM, Gr I-IV mobilizations, manipulation. [x] Modalities as needed that may include: thermal agents, E-stim, Biofeedback, US, iontophoresis as indicated  [x] Patient education on joint protection, postural re-education, activity modification, progression of HEP. HEP instruction: Written HEP instructions provided and reviewed   Access Code: [email or web address]   URL: [email or web address]   Date: 11/16/2020   Prepared by: Bartolome Link     Exercises   Seated Scapular Retraction - 10 reps - 3x daily - 7x weekly   Seated Elbow Flexion and Extension AROM - 10 reps - 3x daily - 7x weekly   Seated Forearm Pronation and Supination AROM - 10 reps - 3x daily - 7x weekly   Seated Wrist Flexion with Overpressure - 10 reps - 3x daily - 7x weekly   Seated Wrist Flexion Extension PROM - 10 reps - 3x daily - 7x weekly   Flexion-Extension Shoulder Pendulum with Table Support - 10 reps - 3x daily - 7x weekly   Horizontal Shoulder Pendulum with Table Support - 10 reps - 3x daily - 7x weekly   Circular Shoulder Pendulum with Table Support - 10 reps - 3x daily - 7x weekly       GOALS:  Patient stated goal: alleviate

## 2020-11-17 NOTE — FLOWSHEET NOTE
hand.    Exercises/Interventions:   Therapeutic Exercise (51066)  Start time: **  End time: ** Resistance / level Sets / Seconds  Reps Notes/Cues          NO L shoulder AROM x4 weeks              Elbow & wrist only                                                        Therapeutic Activities (38903)  Start time: **  End time: **                                          Neuromuscular Re-ed (26927)  Start time: **  End time: **                                          Manual Intervention (59649)  Start time: **  End time: **       Shld /GH Mobs       Post Cap mobs       Thoracic/Rib manipualtion       CT MT/Mobs       L shoulder PROM into flex, scap, ER & IR in LPP  6 mins                Pt. Education:  11/17:  -pt educated on diagnosis, prognosis and expectations for rehab  -all pt questions were answered    Home Exercise Program:  Access Code: [email or web address]   URL: [email or web address]   Date: 11/16/2020   Prepared by: Marilin Castro     Exercises   · Seated Scapular Retraction - 10 reps - 3x daily - 7x weekly   · Seated Elbow Flexion and Extension AROM - 10 reps - 3x daily - 7x weekly   · Seated Forearm Pronation and Supination AROM - 10 reps - 3x daily - 7x weekly   · Seated Wrist Flexion with Overpressure - 10 reps - 3x daily - 7x weekly   · Seated Wrist Flexion Extension PROM - 10 reps - 3x daily - 7x weekly   · Flexion-Extension Shoulder Pendulum with Table Support - 10 reps - 3x daily - 7x weekly   · Horizontal Shoulder Pendulum with Table Support - 10 reps - 3x daily - 7x weekly   · Circular Shoulder Pendulum with Table Support - 10 reps - 3x daily - 7x weekly        Therapeutic Exercise and NMR EXR  [x] (72262) Provided verbal/tactile cueing for activities related to strengthening, flexibility, endurance, ROM  for improvements in scapular, scapulothoracic and UE control with self care, reaching, carrying, lifting, house/yardwork, driving/computer work.    [] (94382) Provided verbal/tactile cueing for activities related to improving balance, coordination, kinesthetic sense, posture, motor skill, proprioception  to assist with  scapular, scapulothoracic and UE control with self care, reaching, carrying, lifting, house/yardwork, driving/computer work. Therapeutic Activities:    [x] (61868 or 15725) Provided verbal/tactile cueing for activities related to improving balance, coordination, kinesthetic sense, posture, motor skill, proprioception and motor activation to allow for proper function of scapular, scapulothoracic and UE control with self care, carrying, lifting, driving/computer work.      Home Exercise Program:    [x] (06367) Reviewed/Progressed HEP activities related to strengthening, flexibility, endurance, ROM of scapular, scapulothoracic and UE control with self care, reaching, carrying, lifting, house/yardwork, driving/computer work  [] (13674) Reviewed/Progressed HEP activities related to improving balance, coordination, kinesthetic sense, posture, motor skill, proprioception of scapular, scapulothoracic and UE control with self care, reaching, carrying, lifting, house/yardwork, driving/computer work      Manual Treatments:  PROM / STM / Oscillations-Mobs:  G-I, II, III, IV (PA's, Inf., Post.)  [x] (37715) Provided manual therapy to mobilize soft tissue/joints of cervical/CT, scapular GHJ and UE for the purpose of modulating pain, promoting relaxation,  increasing ROM, reducing/eliminating soft tissue swelling/inflammation/restriction, improving soft tissue extensibility and allowing for proper ROM for normal function with self care, reaching, carrying, lifting, house/yardwork, driving/computer work    Modalities:  11/16: declined CP  Start time: **  End time: **     Charges:  Timed Code Treatment Minutes: 15   Total Treatment Minutes: 37     [x] EVAL - LOW (14781)   [] EVAL - MOD (67425)  [] EVAL - HIGH (48313)  [] RE-EVAL (88668)  [] VC(91554) x       [] Ionto  [] NMR (33818) x       [] Vaso  [] slowed due to complexities/Impairments listed. [] Progression has been slowed due to co-morbidities. [x] Plan just implemented, too soon to assess goals progression <30days   [] Goals require adjustment due to lack of progress  [] Patient is not progressing as expected and requires additional follow up with physician  [] Other    Persisting Functional Limitations/Impairments:  []Sitting []Standing   []Transfers  []Sleeping   [x]Reaching [x]Lifting   [x]ADLs [x]Housework  [x]Driving [x]Job related tasks  []Sports/Recreation []Other:    ASSESSMENT:  See eval    Treatment/Activity Tolerance:  [x] Pt able to complete treatment [] Patient limited by fatique  [] Patient limited by pain  [] Patient limited by other medical complications  [] Other:     Prognosis:  [x] Good [] Fair  [] Poor    Patient Requires Follow-up: [x] Yes  [] No    Return to Play:    [x]  N/A     []  Stage 1: Intro to Strength   []  Stage 2: Dynamic Strength and Intro to Plyometrics   []  Stage 3: Advanced Plyometrics and Intro to Throwing   []  Stage 4: Sport specific Training/Return to Sport     []  Ready to Return to Play, [employer]   []  Not Ready for Return to Sports   Comments:      PLAN: See eval. PT 2x / week for 6 weeks. [] Continue per plan of care [] Alter current plan (see comments)  [x] Plan of care initiated [] Hold pending MD visit [] Discharge    Electronically signed by: Von Raya PT, DPT      Note: If patient does not return for scheduled/ recommended follow up visits, this note will serve as a discharge from care along with most recent update on progress.

## 2020-11-19 ENCOUNTER — HOSPITAL ENCOUNTER (OUTPATIENT)
Dept: PHYSICAL THERAPY | Age: 60
Setting detail: THERAPIES SERIES
Discharge: HOME | End: 2020-11-19
Payer: COMMERCIAL

## 2020-11-19 PROCEDURE — 97140 MANUAL THERAPY 1/> REGIONS: CPT

## 2020-11-19 PROCEDURE — 97110 THERAPEUTIC EXERCISES: CPT

## 2020-11-19 NOTE — FLOWSHEET NOTE
[facility]  Phone: [phone]   Fax: [phone]    Physical Therapy Treatment Note/ Progress Report:     Date:  2020    Patient Name:  Palma Braxton    :  1960  MRN: [id number]  Restrictions/Precautions:    Medical/Treatment Diagnosis Information:  · Diagnosis: M25.512 (ICD-10-CM) - Left shoulder pain DOS 10/27/20   · Treatment Diagnosis: Decreased ROM & strength of L shoulder following RTC tear and repair on   Insurance/Certification information:  PT Insurance Information: [employer] - Comp management. Claim #[id number];  DOI 10/07/2020. BNN#[phone]  Physician Information:  Referring Practitioner: Casimiro Casarez MD  Plan of care signed (Y/N): []  Yes [x]  No     Date of Patient follow up with Physician:      Progress Report: []  Yes  [x]  No     Date Range for reporting period:  Beginnin2020  Endin2020    Progress report due (10 Rx/or 30 days whichever is less): visit #4 or  (date)     Recertification due (POC duration/ or 90 days whichever is less): visit #12 or 2020 (date)     Visit # Insurance Allowable Auth required? Date Range   12 []  Yes  []  No C9 expires 20     Latex Allergy:  [x]NO      []YES  Preferred Language for Healthcare:   [x]English       []other:    Functional Scale:       Date assessed:  Quick DASH: raw score = 22; dysfunction = 25%      Pain level: 1-2/10     SUBJECTIVE:  Feeling OK, the last week has gone good. Pain is minimal.  Still sleep with the sling. OBJECTIVE: See eval   Observation:    Test measurements:      RESTRICTIONS/PRECAUTIONS: on : Patient may start passive range of motion exercises of the shoulder only for the next 4 weeks. May then start a active assistive range of motion exercise program of the shoulder. Active range of motion exercises may start at 8 weeks. No     strength training to 3 months postop.  May perform active and passive range of motion exercises of the elbow wrist and hand. Exercises/Interventions:   Therapeutic Exercise (65763)  Start time: 1:19 pm  End time: 1:38 pm Resistance / level Sets / Seconds  Reps Notes/Cues          NO L shoulder AROM x4 weeks as of 11/5               Scap squeezes    X 20           Pron/Supination  2#  X 30     Wrist Flex/Ext Stretch   30\" X 2     Wrist Flex/Ext,RD resisted 2#  X 30  ea    Pendulums CW/CCW, flex/ext, horiz abd/add   X 20 each                                        Therapeutic Activities (73671)  Start time:   End time:                                           Neuromuscular Re-ed (72611)  Start time:   End time:                                           Manual Intervention (31853)  Start time: 1:38 pm  End time: 1:55 pm       Shld /GH Mobs       Post Cap mobs       Thoracic/Rib manipualtion       CT MT/Mobs       L shoulder PROM into flex, scap, ER & IR in LPP  17 mins                 Pt. Education:  11/17:  -pt educated on diagnosis, prognosis and expectations for rehab  -all pt questions were answered    Home Exercise Program:  Access Code: [email or web address]   URL: [email or web address]   Date: 11/16/2020   Prepared by: Ruperto Melton     Exercises   · Seated Scapular Retraction - 10 reps - 3x daily - 7x weekly   · Seated Elbow Flexion and Extension AROM - 10 reps - 3x daily - 7x weekly   · Seated Forearm Pronation and Supination AROM - 10 reps - 3x daily - 7x weekly   · Seated Wrist Flexion with Overpressure - 10 reps - 3x daily - 7x weekly   · Seated Wrist Flexion Extension PROM - 10 reps - 3x daily - 7x weekly   · Flexion-Extension Shoulder Pendulum with Table Support - 10 reps - 3x daily - 7x weekly   · Horizontal Shoulder Pendulum with Table Support - 10 reps - 3x daily - 7x weekly   · Circular Shoulder Pendulum with Table Support - 10 reps - 3x daily - 7x weekly        Therapeutic Exercise and NMR EXR  [x] (16253) Provided verbal/tactile cueing for time: **  End time: **     Charges:  Timed Code Treatment Minutes: 36   Total Treatment Minutes: 36     [] EVAL - LOW (73237)   [] EVAL - MOD (28708)  [] EVAL - HIGH (12945)  [] RE-EVAL (48397)  [x] KQ(65495) x 1       [] Ionto  [] NMR (94240) x       [] Vaso  [x] Manual (02108) x 1       [] Ultrasound  [x] TA x  1      [] Mech Traction (45080)  [] Aquatic Therapy x     [] ES (un) (57463):   [] Home Management Training x  [] ES(attended) (03522)   [] Dry Needling 1-2 muscles (46416):  [] Dry Needling 3+ muscles (029406  [] Group:      [] Other:                     GOALS:  Patient stated goal: alleviate pain, return to work  []? Progressing: []? Met: []? Not Met: []? Adjusted     Therapist goals for Patient:   Short Term Goals: To be achieved in: 2 weeks  1. Independent in HEP and progression per patient tolerance, in order to prevent re-injury. []? Progressing: []? Met: []? Not Met: []? Adjusted  2. Patient will have a decrease in pain to facilitate improvement in movement, function, and ADLs as indicated by Functional Deficits. []? Progressing: []? Met: []? Not Met: []? Adjusted     Long Term Goals: To be achieved in: 12 weeks  1. Disability index score of 5% or less for the Quick DASH to assist with reaching prior level of function. []? Progressing: []? Met: []? Not Met: []? Adjusted  2. Patient will demonstrate increased L shoulder AROM to [facility] to allow for proper joint functioning as indicated by patients Functional Deficits. []? Progressing: []? Met: []? Not Met: []? Adjusted  3. Patient will demonstrate an increase in L shoulder strength to 4/5 to allow for proper functional mobility as indicated by patients Functional Deficits. []? Progressing: []? Met: []? Not Met: []? Adjusted  4. Patient will return to functional activities including sleeping and driving without increased symptoms or restriction. []? Progressing: []? Met: []? Not Met: []? Adjusted  5.  Patient to lift 25# pain-free in order to will serve as a discharge from care along with most recent update on progress.

## 2020-11-23 ENCOUNTER — HOSPITAL ENCOUNTER (OUTPATIENT)
Dept: PHYSICAL THERAPY | Age: 60
Setting detail: THERAPIES SERIES
Discharge: HOME | End: 2020-11-23
Payer: COMMERCIAL

## 2020-11-23 PROCEDURE — 97140 MANUAL THERAPY 1/> REGIONS: CPT

## 2020-11-23 PROCEDURE — 97110 THERAPEUTIC EXERCISES: CPT

## 2020-11-23 NOTE — FLOWSHEET NOTE
[facility]  Phone: [phone]   Fax: [phone]    Physical Therapy Treatment Note/ Progress Report:     Date:  2020    Patient Name:  Andreea Menon    :  1960  MRN: [id number]  Restrictions/Precautions:    Medical/Treatment Diagnosis Information:  · Diagnosis: M25.512 (ICD-10-CM) - Left shoulder pain DOS 10/27/20   · Treatment Diagnosis: Decreased ROM & strength of L shoulder following RTC tear and repair on   Insurance/Certification information:  PT Insurance Information: [employer] - Comp management. Claim #[id number];  DOI 10/07/2020. WXR#[phone]  Physician Information:  Referring Practitioner: Curley Bumpers, MD  Plan of care signed (Y/N): []  Yes [x]  No     Date of Patient follow up with Physician:      Progress Report: []  Yes  [x]  No     Date Range for reporting period:  Beginnin2020  Endin2020    Progress report due (10 Rx/or 30 days whichever is less): visit #4 or  (date)     Recertification due (POC duration/ or 90 days whichever is less): visit #12 or 2020 (date)     Visit # Insurance Allowable Auth required? Date Range   3/12 12 []  Yes  []  No C9 expires 20     Latex Allergy:  [x]NO      []YES  Preferred Language for Healthcare:   [x]English       []other:    Functional Scale:       Date assessed:  Quick DASH: raw score = 22; dysfunction = 25%      Pain level: 1/10     SUBJECTIVE:   Pt reports pain is pretty low. He states his arm \"lets him know\" if he is doing something he is not supposed to. OBJECTIVE:    Observation:    Test measurements:  : PROM L shoulder flexion: 140 deg    RESTRICTIONS/PRECAUTIONS: on : Patient may start passive range of motion exercises of the shoulder only for the next 4 weeks. May then start a active assistive range of motion exercise program of the shoulder. Active range of motion exercises may start at 8 weeks. No     strength training to 3 months postop. May perform active and passive range of motion exercises of the elbow wrist and hand. Exercises/Interventions:   Therapeutic Exercise (08604)  Start time: 10:57am  End time: 11:06am  Resistance / level Sets / Seconds  Reps Notes/Cues          NO L shoulder AROM x4 weeks as of 11/5               Scap squeezes    X 22           Pron/Supination  2#  X 30     Wrist Flex/Ext Stretch   30\" X 2     Wrist Flex/Ext,RD resisted 2#  X 25  ea    Pendulums CW/CCW, flex/ext, horiz abd/add       Bicep curls - palm up  Bicep curls - palm in 2#  2#  X 20  X 20                                 Therapeutic Activities (86285)  Start time:   End time:                                           Neuromuscular Re-ed (38762)  Start time:   End time:                                           Manual Intervention (89096)  Start time: 10:36am  End time: 10:56am       Shld /GH Mobs       Post Cap mobs       Thoracic/Rib manipualtion       CT MT/Mobs       L shoulder PROM into flex, scap, ER & IR in LPP  20 mins                 Pt. Education:  11/17:  -pt educated on diagnosis, prognosis and expectations for rehab  -all pt questions were answered    Home Exercise Program:  Access Code: [email or web address]   URL: [email or web address]   Date: 11/16/2020   Prepared by: Von Raya     Exercises   · Seated Scapular Retraction - 10 reps - 3x daily - 7x weekly   · Seated Elbow Flexion and Extension AROM - 10 reps - 3x daily - 7x weekly   · Seated Forearm Pronation and Supination AROM - 10 reps - 3x daily - 7x weekly   · Seated Wrist Flexion with Overpressure - 10 reps - 3x daily - 7x weekly   · Seated Wrist Flexion Extension PROM - 10 reps - 3x daily - 7x weekly   · Flexion-Extension Shoulder Pendulum with Table Support - 10 reps - 3x daily - 7x weekly   · Horizontal Shoulder Pendulum with Table Support - 10 reps - 3x daily - 7x weekly   · Circular Shoulder Pendulum with Table Support - 10 reps - 3x daily - 7x weekly        Therapeutic Exercise and NMR EXR  [x] (01034) Provided verbal/tactile cueing for activities related to strengthening, flexibility, endurance, ROM  for improvements in scapular, scapulothoracic and UE control with self care, reaching, carrying, lifting, house/yardwork, driving/computer work.    [] (46529) Provided verbal/tactile cueing for activities related to improving balance, coordination, kinesthetic sense, posture, motor skill, proprioception  to assist with  scapular, scapulothoracic and UE control with self care, reaching, carrying, lifting, house/yardwork, driving/computer work. Therapeutic Activities:    [] (69514 or 80895) Provided verbal/tactile cueing for activities related to improving balance, coordination, kinesthetic sense, posture, motor skill, proprioception and motor activation to allow for proper function of scapular, scapulothoracic and UE control with self care, carrying, lifting, driving/computer work.      Home Exercise Program:    [x] (47693) Reviewed/Progressed HEP activities related to strengthening, flexibility, endurance, ROM of scapular, scapulothoracic and UE control with self care, reaching, carrying, lifting, house/yardwork, driving/computer work  [] (70798) Reviewed/Progressed HEP activities related to improving balance, coordination, kinesthetic sense, posture, motor skill, proprioception of scapular, scapulothoracic and UE control with self care, reaching, carrying, lifting, house/yardwork, driving/computer work      Manual Treatments:  PROM / STM / Oscillations-Mobs:  G-I, II, III, IV (PA's, Inf., Post.)  [x] (41463) Provided manual therapy to mobilize soft tissue/joints of cervical/CT, scapular GHJ and UE for the purpose of modulating pain, promoting relaxation,  increasing ROM, reducing/eliminating soft tissue swelling/inflammation/restriction, improving soft tissue extensibility and allowing for proper ROM for normal function with self care, restriction. []? Progressing: []? Met: []? Not Met: []? Adjusted  5. Patient to lift 25# pain-free in order to return normal work duty unrestricted. []? Progressing: []? Met: []? Not Met: []? Adjusted            Overall Progression Towards Functional goals/ Treatment Progress Update:  [] Patient is progressing as expected towards functional goals listed. [] Progression is slowed due to complexities/Impairments listed. [] Progression has been slowed due to co-morbidities. [x] Plan just implemented, too soon to assess goals progression <30days   [] Goals require adjustment due to lack of progress  [] Patient is not progressing as expected and requires additional follow up with physician  [] Other    Persisting Functional Limitations/Impairments:  []Sitting []Standing   []Transfers  []Sleeping   [x]Reaching [x]Lifting   [x]ADLs [x]Housework  [x]Driving [x]Job related tasks  []Sports/Recreation []Other:    ASSESSMENT: Pt again educated on importance of maintaining restrictions to avoid damaging repair. Pt cued to perform wrist/elbow exercises with free weights slowly. Pt to benefit from continued therapy to improve ROM prior to strengthening. Treatment/Activity Tolerance:  [x] Pt able to complete treatment [] Patient limited by fatique  [] Patient limited by pain  [] Patient limited by other medical complications  [] Other:     Prognosis:  [x] Good [] Fair  [] Poor    Patient Requires Follow-up: [x] Yes  [] No    Return to Play:    [x]  N/A     []  Stage 1: Intro to Strength   []  Stage 2: Dynamic Strength and Intro to Plyometrics   []  Stage 3: Advanced Plyometrics and Intro to Throwing   []  Stage 4: Sport specific Training/Return to Sport     []  Ready to Return to Play, [employer]   []  Not Ready for Return to Sports   Comments:      PLAN: See eval. PT 2x / week for 6 weeks.   [x] Continue per plan of care [] Alter current plan (see comments)  [] Plan of care initiated [] Hold pending MD visit [] Discharge    Electronically signed by: Paloma Schmitz PT DPT      Note: If patient does not return for scheduled/ recommended follow up visits, this note will serve as a discharge from care along with most recent update on progress.

## 2020-11-24 ENCOUNTER — HOSPITAL ENCOUNTER (OUTPATIENT)
Dept: SLEEP CENTER | Age: 60
Discharge: HOME | End: 2020-11-24
Payer: COMMERCIAL

## 2020-11-24 PROCEDURE — 95806 SLEEP STUDY UNATT&RESP EFFT: CPT

## 2020-11-25 ENCOUNTER — HOSPITAL ENCOUNTER (OUTPATIENT)
Dept: PHYSICAL THERAPY | Age: 60
Setting detail: THERAPIES SERIES
Discharge: HOME | End: 2020-11-25
Payer: COMMERCIAL

## 2020-11-25 PROCEDURE — 97140 MANUAL THERAPY 1/> REGIONS: CPT

## 2020-11-25 PROCEDURE — 97110 THERAPEUTIC EXERCISES: CPT

## 2020-11-25 NOTE — FLOWSHEET NOTE
strength training to 3 months postop. May perform active and passive range of motion exercises of the elbow wrist and hand. Exercises/Interventions:   Therapeutic Exercise (34963)  Start time: 12:24 pm  End time:  12:35 pm Resistance / level Sets / Seconds  Reps Notes/Cues          NO L shoulder AROM x4 weeks as of 11/5        AAROM start around 12/5        Scap squeezes    X 25           Pron/Supination  3# 2 X 30     Wrist Flex/Ext Stretch   30\" X 2     Wrist Flex/Ext,RD resisted 3#  X 25  ea    Pendulums CW/CCW, flex/ext, horiz abd/add       Bicep curls - palm up  Bicep curls - palm in 3#  3# 2  2 X 20  X 20                                 Therapeutic Activities (50872)  Start time:   End time:                                           Neuromuscular Re-ed (09782)  Start time:   End time:                                           Manual Intervention (00472)  Start time: 12:35 pm   End time: 12:55 pm       Shld /GH Mobs       Post Cap mobs       Thoracic/Rib manipualtion       CT MT/Mobs       L shoulder PROM into flex, scap, ER & IR in LPP  20 mins                 Pt. Education:  11/17:  -pt educated on diagnosis, prognosis and expectations for rehab  -all pt questions were answered    Home Exercise Program:  Access Code: [email or web address]   URL: [email or web address]   Date: 11/16/2020   Prepared by: Omid Martines     Exercises   · Seated Scapular Retraction - 10 reps - 3x daily - 7x weekly   · Seated Elbow Flexion and Extension AROM - 10 reps - 3x daily - 7x weekly   · Seated Forearm Pronation and Supination AROM - 10 reps - 3x daily - 7x weekly   · Seated Wrist Flexion with Overpressure - 10 reps - 3x daily - 7x weekly   · Seated Wrist Flexion Extension PROM - 10 reps - 3x daily - 7x weekly   · Flexion-Extension Shoulder Pendulum with Table Support - 10 reps - 3x daily - 7x weekly   · Horizontal Shoulder Pendulum with Table Support - 10 reps - 3x daily - 7x weekly   · Circular Shoulder Pendulum with Table Support - 10 reps - 3x daily - 7x weekly        Therapeutic Exercise and NMR EXR  [x] (13724) Provided verbal/tactile cueing for activities related to strengthening, flexibility, endurance, ROM  for improvements in scapular, scapulothoracic and UE control with self care, reaching, carrying, lifting, house/yardwork, driving/computer work.    [] (17109) Provided verbal/tactile cueing for activities related to improving balance, coordination, kinesthetic sense, posture, motor skill, proprioception  to assist with  scapular, scapulothoracic and UE control with self care, reaching, carrying, lifting, house/yardwork, driving/computer work. Therapeutic Activities:    [] (79398 or 14161) Provided verbal/tactile cueing for activities related to improving balance, coordination, kinesthetic sense, posture, motor skill, proprioception and motor activation to allow for proper function of scapular, scapulothoracic and UE control with self care, carrying, lifting, driving/computer work.      Home Exercise Program:    [x] (22603) Reviewed/Progressed HEP activities related to strengthening, flexibility, endurance, ROM of scapular, scapulothoracic and UE control with self care, reaching, carrying, lifting, house/yardwork, driving/computer work  [] (61320) Reviewed/Progressed HEP activities related to improving balance, coordination, kinesthetic sense, posture, motor skill, proprioception of scapular, scapulothoracic and UE control with self care, reaching, carrying, lifting, house/yardwork, driving/computer work      Manual Treatments:  PROM / STM / Oscillations-Mobs:  G-I, II, III, IV (PA's, Inf., Post.)  [x] (75948) Provided manual therapy to mobilize soft tissue/joints of cervical/CT, scapular GHJ and UE for the purpose of modulating pain, promoting relaxation,  increasing ROM, reducing/eliminating soft tissue swelling/inflammation/restriction, improving soft tissue extensibility and allowing for proper ROM for normal function with self care, reaching, carrying, lifting, house/yardwork, driving/computer work    Modalities:  11/16, 11/20: declined CP  Start time: **  End time: **     Charges:  Timed Code Treatment Minutes: 31   Total Treatment Minutes: 31     [] EVAL - LOW (18325)   [] EVAL - MOD (41050)  [] EVAL - HIGH (19803)  [] RE-EVAL (95873)  [x] TR(09488) x 1       [] Ionto  [] NMR (69128) x       [] Vaso  [x] Manual (12133) x 1       [] Ultrasound  [] TA x        [] Mech Traction (25550)  [] Aquatic Therapy x      [] ES (un) (90503):   [] Home Management Training x  [] ES(attended) (81540)   [] Dry Needling 1-2 muscles (76261):  [] Dry Needling 3+ muscles (922170  [] Group:      [] Other:                     GOALS:  Patient stated goal: alleviate pain, return to work  []? Progressing: []? Met: []? Not Met: []? Adjusted     Therapist goals for Patient:   Short Term Goals: To be achieved in: 2 weeks  1. Independent in HEP and progression per patient tolerance, in order to prevent re-injury. []? Progressing: []? Met: []? Not Met: []? Adjusted  2. Patient will have a decrease in pain to facilitate improvement in movement, function, and ADLs as indicated by Functional Deficits. []? Progressing: []? Met: []? Not Met: []? Adjusted     Long Term Goals: To be achieved in: 12 weeks  1. Disability index score of 5% or less for the Quick DASH to assist with reaching prior level of function. []? Progressing: []? Met: []? Not Met: []? Adjusted  2. Patient will demonstrate increased L shoulder AROM to [facility] to allow for proper joint functioning as indicated by patients Functional Deficits. []? Progressing: []? Met: []? Not Met: []? Adjusted  3. Patient will demonstrate an increase in L shoulder strength to 4/5 to allow for proper functional mobility as indicated by patients Functional Deficits. []? Progressing: []? Met: []? Not Met: []? Adjusted  4.  Patient will return to functional activities including sleeping and driving without increased symptoms or restriction. []? Progressing: []? Met: []? Not Met: []? Adjusted  5. Patient to lift 25# pain-free in order to return normal work duty unrestricted. []? Progressing: []? Met: []? Not Met: []? Adjusted            Overall Progression Towards Functional goals/ Treatment Progress Update:  [] Patient is progressing as expected towards functional goals listed. [] Progression is slowed due to complexities/Impairments listed. [] Progression has been slowed due to co-morbidities. [x] Plan just implemented, too soon to assess goals progression <30days   [] Goals require adjustment due to lack of progress  [] Patient is not progressing as expected and requires additional follow up with physician  [] Other    Persisting Functional Limitations/Impairments:  []Sitting []Standing   []Transfers  []Sleeping   [x]Reaching [x]Lifting   [x]ADLs [x]Housework  [x]Driving [x]Job related tasks  []Sports/Recreation []Other:    ASSESSMENT: Pt again educated on importance of maintaining restrictions to avoid damaging repair. Pt cued to perform wrist/elbow exercises with free weights slowly. Pt to benefit from continued therapy to improve ROM prior to strengthening. Treatment/Activity Tolerance:  [x] Pt able to complete treatment [] Patient limited by fatique  [] Patient limited by pain  [] Patient limited by other medical complications  [] Other:     Prognosis:  [x] Good [] Fair  [] Poor    Patient Requires Follow-up: [x] Yes  [] No    Return to Play:    [x]  N/A     []  Stage 1: Intro to Strength   []  Stage 2: Dynamic Strength and Intro to Plyometrics   []  Stage 3: Advanced Plyometrics and Intro to Throwing   []  Stage 4: Sport specific Training/Return to Sport     []  Ready to Return to Play, [employer]   []  Not Ready for Return to Sports   Comments:      PLAN: See eval. PT 2x / week for 6 weeks.   [x] Continue per plan of care [] Alter current plan (see comments)  [] Plan of care initiated [] Hold pending MD visit [] Discharge    Electronically signed by: Victorino Baron PT DPT      Note: If patient does not return for scheduled/ recommended follow up visits, this note will serve as a discharge from care along with most recent update on progress.

## 2020-11-29 PROCEDURE — 95806 SLEEP STUDY UNATT&RESP EFFT: CPT | Performed by: INTERNAL MEDICINE

## 2020-11-30 ENCOUNTER — HOSPITAL ENCOUNTER (OUTPATIENT)
Dept: PHYSICAL THERAPY | Age: 60
Setting detail: THERAPIES SERIES
Discharge: HOME | End: 2020-11-30
Payer: COMMERCIAL

## 2020-11-30 PROCEDURE — 97140 MANUAL THERAPY 1/> REGIONS: CPT

## 2020-11-30 NOTE — FLOWSHEET NOTE
[facility]  Phone: [phone]   Fax: [phone]    Physical Therapy Treatment Note/ Progress Report:     Date:  2020    Patient Name:  Eddi Kolb    :  1960  MRN: [id number]  Restrictions/Precautions:    Medical/Treatment Diagnosis Information:  · Diagnosis: M25.512 (ICD-10-CM) - Left shoulder pain DOS 10/27/20   · Treatment Diagnosis: Decreased ROM & strength of L shoulder following RTC tear and repair on   Insurance/Certification information:  PT Insurance Information: [employer] - Comp management. Claim #[id number];  DOI 10/07/2020. [facility]#[phone]  Physician Information:  Referring Practitioner: Thelma Pascal MD  Plan of care signed (Y/N): []  Yes [x]  No     Date of Patient follow up with Physician:      Progress Report: [x]  Yes  []  No     Date Range for reporting period:  Beginnin2020  Endin2020    Progress report due (10 Rx/or 30 days whichever is less): visit #4 or  (date)     Recertification due (POC duration/ or 90 days whichever is less): visit #12 or 2020 (date)     Visit # Insurance Allowable Auth required? Date Range   12 []  Yes  []  No C9 expires 20     Latex Allergy:  [x]NO      []YES  Preferred Language for Healthcare:   [x]English       []other:    Functional Scale:       Date assessed:  Quick DASH: raw score = 22; dysfunction = 25%      Pain level: 0/10     SUBJECTIVE: Pt reports no pain currently.  Pt's MD appt was moved to 3pm.      OBJECTIVE:    Observation:    Test measurements:  : PROM L shoulder flexion: 140 deg    : pt 8 min late    PROM AROM     L R L R   Shoulder Flexion  151 deg   Deferred WFL   Shoulder Scaption 124 deg   Deferred [facility]   Shoulder External Rotation  60 deg at 90 abd   Deferred [facility]   Shoulder Internal Rotation  65 deg at 90 abd   Deferred WFL       RESTRICTIONS/PRECAUTIONS: on : Patient may start passive range of motion exercises of the shoulder only for the next 4 weeks. May then start a active assistive range of motion exercise program of the shoulder. Active range of motion exercises may start at 8 weeks. No     strength training to 3 months postop. May perform active and passive range of motion exercises of the elbow wrist and hand. Exercises/Interventions:   Therapeutic Exercise (43731)  Start time: 2:06 pm  End time: 2:10 pm Resistance / level Sets / Seconds  Reps Notes/Cues          NO L shoulder AROM x4 weeks as of 11/5        AAROM start around 12/5        Scap squeezes    X 25           Pron/Supination     Wrist Flex/Ext Stretch   30\" X 2     Wrist Flex/Ext,RD resisted 3#  X 25  ea    Pendulums CW/CCW, flex/ext, horiz abd/add       Bicep curls - palm up  Bicep curls - palm in                                Therapeutic Activities (29725)  Start time:   End time:                                           Neuromuscular Re-ed (01036)  Start time:   End time:                                           Manual Intervention (96488)  Start time: 1:40 pm   End time: 2:05 pm       Shld /GH Mobs       Post Cap mobs       Thoracic/Rib manipualtion       CT MT/Mobs       L shoulder PROM into flex, scap, ER & IR in LPP  25 mins                 Pt. Education:  11/17:  -pt educated on diagnosis, prognosis and expectations for rehab  -all pt questions were answered    Home Exercise Program:  Access Code: [email or web address]   URL: [email or web address]   Date: 11/16/2020   Prepared by: Sagrario Arce     Exercises   · Seated Scapular Retraction - 10 reps - 3x daily - 7x weekly   · Seated Elbow Flexion and Extension AROM - 10 reps - 3x daily - 7x weekly   · Seated Forearm Pronation and Supination AROM - 10 reps - 3x daily - 7x weekly   · Seated Wrist Flexion with Overpressure - 10 reps - 3x daily - 7x weekly   · Seated Wrist Flexion Extension PROM - 10 reps - 3x daily - 7x weekly   · Flexion-Extension Shoulder Pendulum with Table Support - 10 reps - 3x daily - 7x weekly   · Horizontal Shoulder Pendulum with Table Support - 10 reps - 3x daily - 7x weekly   · Circular Shoulder Pendulum with Table Support - 10 reps - 3x daily - 7x weekly        Therapeutic Exercise and NMR EXR  [x] (93646) Provided verbal/tactile cueing for activities related to strengthening, flexibility, endurance, ROM  for improvements in scapular, scapulothoracic and UE control with self care, reaching, carrying, lifting, house/yardwork, driving/computer work.    [] (11628) Provided verbal/tactile cueing for activities related to improving balance, coordination, kinesthetic sense, posture, motor skill, proprioception  to assist with  scapular, scapulothoracic and UE control with self care, reaching, carrying, lifting, house/yardwork, driving/computer work. Therapeutic Activities:    [] (76144 or 39819) Provided verbal/tactile cueing for activities related to improving balance, coordination, kinesthetic sense, posture, motor skill, proprioception and motor activation to allow for proper function of scapular, scapulothoracic and UE control with self care, carrying, lifting, driving/computer work.      Home Exercise Program:    [x] (09227) Reviewed/Progressed HEP activities related to strengthening, flexibility, endurance, ROM of scapular, scapulothoracic and UE control with self care, reaching, carrying, lifting, house/yardwork, driving/computer work  [] (05610) Reviewed/Progressed HEP activities related to improving balance, coordination, kinesthetic sense, posture, motor skill, proprioception of scapular, scapulothoracic and UE control with self care, reaching, carrying, lifting, house/yardwork, driving/computer work      Manual Treatments:  PROM / STM / Oscillations-Mobs:  G-I, II, III, IV (PA's, Inf., Post.)  [x] (00273) Provided manual therapy to mobilize soft tissue/joints of cervical/CT, scapular GHJ and UE for the purpose of modulating pain, promoting relaxation,  increasing ROM, reducing/eliminating soft tissue swelling/inflammation/restriction, improving soft tissue extensibility and allowing for proper ROM for normal function with self care, reaching, carrying, lifting, house/yardwork, driving/computer work    Modalities:  11/16, 11/20: declined CP  Start time: **  End time: **     Charges:  Timed Code Treatment Minutes: 30   Total Treatment Minutes: 30     [] EVAL - LOW (30533)   [] EVAL - MOD (68526)  [] EVAL - HIGH (60765)  [] RE-EVAL (61836)  [] HP(88528) x        [] Ionto  [] NMR (17082) x       [] Vaso  [x] Manual (64305) x 2       [] Ultrasound  [] TA x        [] Mech Traction (14466)  [] Aquatic Therapy x      [] ES (un) (96675):   [] Home Management Training x  [] ES(attended) (00852)   [] Dry Needling 1-2 muscles (81084):  [] Dry Needling 3+ muscles (137819  [] Group:      [] Other:                     GOALS:  Patient stated goal: alleviate pain, return to work  []? Progressing: []? Met: []? Not Met: []? Adjusted     Therapist goals for Patient:   Short Term Goals: To be achieved in: 2 weeks  1. Independent in HEP and progression per patient tolerance, in order to prevent re-injury. [x]? Progressing: []? Met: []? Not Met: []? Adjusted  2. Patient will have a decrease in pain to facilitate improvement in movement, function, and ADLs as indicated by Functional Deficits. [x]? Progressing: []? Met: []? Not Met: []? Adjusted     Long Term Goals: To be achieved in: 12 weeks  1. Disability index score of 5% or less for the Quick DASH to assist with reaching prior level of function. []? Progressing: []? Met: [x]? Not Met: []? Adjusted  2. Patient will demonstrate increased L shoulder AROM to [facility] to allow for proper joint functioning as indicated by patients Functional Deficits. []? Progressing: []? Met: [x]? Not Met: []? Adjusted  3.  Patient will demonstrate an increase in L shoulder strength to 4/5 to allow for proper functional mobility as indicated Stages   []  Not Ready for Return to Sports   Comments:      PLAN: See eval. PT 2x / week for 6 weeks. [x] Continue per plan of care [] Alter current plan (see comments)  [] Plan of care initiated [] Hold pending MD visit [] Discharge    Electronically signed by: Rj Driver PT DPT      Note: If patient does not return for scheduled/ recommended follow up visits, this note will serve as a discharge from care along with most recent update on progress.

## 2020-12-03 ENCOUNTER — HOSPITAL ENCOUNTER (OUTPATIENT)
Dept: PHYSICAL THERAPY | Age: 60
Setting detail: THERAPIES SERIES
Discharge: HOME | End: 2020-12-03
Payer: COMMERCIAL

## 2020-12-03 ENCOUNTER — TELEPHONE (OUTPATIENT)
Dept: PULMONOLOGY | Age: 60
End: 2020-12-03

## 2020-12-03 ENCOUNTER — TELEPHONE (OUTPATIENT)
Dept: SLEEP CENTER | Age: 60
End: 2020-12-03

## 2020-12-03 PROCEDURE — 97140 MANUAL THERAPY 1/> REGIONS: CPT

## 2020-12-03 NOTE — TELEPHONE ENCOUNTER
Called pt to schedule over night titration study per Dr. Martin Herndon. I did speak with the pt and explained he needed an over night sleep study -that we'd need to schedule at [facility] or [facility]. Pt declined the test.   Abigail Venegas he could not do an over night sleep study at this time. I'll pass along to  and next step can be decided then.

## 2020-12-03 NOTE — FLOWSHEET NOTE
[facility]  Phone: [phone]   Fax: [phone]    Physical Therapy Treatment Note/ Progress Report:     Date:  12/3/2020    Patient Name:  Kevin Martinez    :  1960  MRN: [id number]  Restrictions/Precautions:    Medical/Treatment Diagnosis Information:  · Diagnosis: M25.512 (ICD-10-CM) - Left shoulder pain DOS 10/27/20   · Treatment Diagnosis: Decreased ROM & strength of L shoulder following RTC tear and repair on 15/52/4878  Insurance/Certification information:  PT Insurance Information: [employer] - Comp management. Claim #[id number];  DOI 10/07/2020. PAD#[phone]  Physician Information:  Referring Practitioner: Suzie Irvin MD  Plan of care signed (Y/N): []  Yes [x]  No     Date of Patient follow up with Physician:      Progress Report: [x]  Yes  []  No     Date Range for reporting period:  Beginnin2020  Ending:     Progress report due (10 Rx/or 30 days whichever is less): visit #9 or  (date)     Recertification due (POC duration/ or 90 days whichever is less): visit #12 or 2020 (date)     Visit # Insurance Allowable Auth required? Date Range   12 []  Yes  []  No C9 expires 20     Latex Allergy:  [x]NO      []YES  Preferred Language for Healthcare:   [x]English       []other:    Functional Scale:       Date assessed:  Quick DASH: raw score = 22; dysfunction = 25%      Pain level: 0/10     SUBJECTIVE:     Pt more tired today, states he had a tooth pulled yesterday and hasn't been sleeping much because of his tooth pain. Is better today but is very tired and worn out. L shoulder is doing fine.        OBJECTIVE:    Observation:    Test measurements:  : PROM L shoulder flexion: 140 deg    : pt 8 min late    PROM AROM     L R L R   Shoulder Flexion  151 deg   Deferred WFL   Shoulder Scaption 124 deg   Deferred [facility]   Shoulder External Rotation  60 deg at 90 abd   Deferred [facility] 3x daily - 7x weekly   · Seated Wrist Flexion with Overpressure - 10 reps - 3x daily - 7x weekly   · Seated Wrist Flexion Extension PROM - 10 reps - 3x daily - 7x weekly   · Flexion-Extension Shoulder Pendulum with Table Support - 10 reps - 3x daily - 7x weekly   · Horizontal Shoulder Pendulum with Table Support - 10 reps - 3x daily - 7x weekly   · Circular Shoulder Pendulum with Table Support - 10 reps - 3x daily - 7x weekly        Therapeutic Exercise and NMR EXR  [x] (00974) Provided verbal/tactile cueing for activities related to strengthening, flexibility, endurance, ROM  for improvements in scapular, scapulothoracic and UE control with self care, reaching, carrying, lifting, house/yardwork, driving/computer work.    [] (08919) Provided verbal/tactile cueing for activities related to improving balance, coordination, kinesthetic sense, posture, motor skill, proprioception  to assist with  scapular, scapulothoracic and UE control with self care, reaching, carrying, lifting, house/yardwork, driving/computer work. Therapeutic Activities:    [] (94851 or 86802) Provided verbal/tactile cueing for activities related to improving balance, coordination, kinesthetic sense, posture, motor skill, proprioception and motor activation to allow for proper function of scapular, scapulothoracic and UE control with self care, carrying, lifting, driving/computer work.      Home Exercise Program:    [x] (79393) Reviewed/Progressed HEP activities related to strengthening, flexibility, endurance, ROM of scapular, scapulothoracic and UE control with self care, reaching, carrying, lifting, house/yardwork, driving/computer work  [] (52396) Reviewed/Progressed HEP activities related to improving balance, coordination, kinesthetic sense, posture, motor skill, proprioception of scapular, scapulothoracic and UE control with self care, reaching, carrying, lifting, house/yardwork, driving/computer work      Manual Treatments:  PROM / STM / Oscillations-Mobs:  G-I, II, III, IV (PA's, Inf., Post.)  [x] (65484) Provided manual therapy to mobilize soft tissue/joints of cervical/CT, scapular GHJ and UE for the purpose of modulating pain, promoting relaxation,  increasing ROM, reducing/eliminating soft tissue swelling/inflammation/restriction, improving soft tissue extensibility and allowing for proper ROM for normal function with self care, reaching, carrying, lifting, house/yardwork, driving/computer work    Modalities:  11/16, 11/20: declined CP  Start time: **  End time: **     Charges:  Timed Code Treatment Minutes: 31   Total Treatment Minutes: 31     [] EVAL - LOW (19553)   [] EVAL - MOD (76579)  [] EVAL - HIGH (31620)  [] RE-EVAL (71084)  [] ZL(94272) x        [] Ionto  [] NMR (36361) x       [] Vaso  [x] Manual (92438) x 2       [] Ultrasound  [] TA x        [] Mech Traction (27390)  [] Aquatic Therapy x      [] ES (un) (28786):   [] Home Management Training x  [] ES(attended) (38684)   [] Dry Needling 1-2 muscles (19219):  [] Dry Needling 3+ muscles (938266  [] Group:      [] Other:                     GOALS:  Patient stated goal: alleviate pain, return to work  []? Progressing: []? Met: []? Not Met: []? Adjusted     Therapist goals for Patient:   Short Term Goals: To be achieved in: 2 weeks  1. Independent in HEP and progression per patient tolerance, in order to prevent re-injury. [x]? Progressing: []? Met: []? Not Met: []? Adjusted  2. Patient will have a decrease in pain to facilitate improvement in movement, function, and ADLs as indicated by Functional Deficits. [x]? Progressing: []? Met: []? Not Met: []? Adjusted     Long Term Goals: To be achieved in: 12 weeks  1. Disability index score of 5% or less for the Quick DASH to assist with reaching prior level of function. []? Progressing: []? Met: [x]? Not Met: []? Adjusted  2.  Patient will demonstrate increased L shoulder AROM to [facility] to allow for proper joint functioning as indicated by patients Functional Deficits. []? Progressing: []? Met: [x]? Not Met: []? Adjusted  3. Patient will demonstrate an increase in L shoulder strength to 4/5 to allow for proper functional mobility as indicated by patients Functional Deficits. []? Progressing: []? Met: [x]? Not Met: []? Adjusted  4. Patient will return to functional activities including sleeping and driving without increased symptoms or restriction. []? Progressing: []? Met: [x]? Not Met: []? Adjusted  5. Patient to lift 25# pain-free in order to return normal work duty unrestricted. []? Progressing: []? Met: [x]? Not Met: []? Adjusted            Overall Progression Towards Functional goals/ Treatment Progress Update:  [] Patient is progressing as expected towards functional goals listed. [] Progression is slowed due to complexities/Impairments listed. [] Progression has been slowed due to co-morbidities. [x] Plan just implemented, too soon to assess goals progression <30days   [] Goals require adjustment due to lack of progress  [] Patient is not progressing as expected and requires additional follow up with physician  [] Other    Persisting Functional Limitations/Impairments:  []Sitting []Standing   []Transfers  []Sleeping   [x]Reaching [x]Lifting   [x]ADLs [x]Housework  [x]Driving [x]Job related tasks  []Sports/Recreation []Other:    ASSESSMENT:   Pt has made improvements with PROM, may begin AAROM next session. Pt requires continued cueing to maintain restrictions. Will continue to benefit from skilled PT to progress through precautions and improve ROM and strength to return to PLOF.      Treatment/Activity Tolerance:  [x] Pt able to complete treatment [] Patient limited by fatique  [] Patient limited by pain  [] Patient limited by other medical complications  [] Other:     Prognosis:  [x] Good [] Fair  [] Poor    Patient Requires Follow-up: [x] Yes  [] No    Return to Play:    [x]  N/A     []  Stage 1: Intro to Strength   []  Stage 2: Dynamic Strength and Intro to Plyometrics   []  Stage 3: Advanced Plyometrics and Intro to Throwing   []  Stage 4: Sport specific Training/Return to Sport     []  Ready to Return to Play, [employer] Stages   []  Not Ready for Return to Sports   Comments:      PLAN: See eval. PT 2x / week for 6 weeks. [x] Continue per plan of care [] Alter current plan (see comments)  [] Plan of care initiated [] Hold pending MD visit [] Discharge    Electronically signed by: Kim Costa PT DPT      Note: If patient does not return for scheduled/ recommended follow up visits, this note will serve as a discharge from care along with most recent update on progress.

## 2020-12-03 NOTE — TELEPHONE ENCOUNTER
----- Message from Shorty Chavez MD sent at 12/3/2020  9:34 AM EST -----  Regarding: Auto CPAP  Since patient is not interested in an in lab cpap titration study, I will order for an auto CPAP and send it to [facility].      Thanks    Shorty Chvaez MD  ----- Message -----  From: Kayden Palumbo  Sent: 12/3/2020   9:08 AM EST  To: Shorty Chavez MD    Please see telephone encounter

## 2020-12-03 NOTE — PROGRESS NOTES
Marielle Heath         : 1960        PHONE: [phone] (home)     Diagnosis: [x] SANDRA (G47.33) [] CSA (G47.31) [] Apnea (G47.30)   Length of Need: [] 12 Months [x] 99 Months [] Other:    Machine (MIREILLE!): [x] Respironics Dream Station      Auto [] ResMed AirSense     Auto [] Other:     [x]  CPAP () [] Bilevel ()   Mode: [x] Auto [] Spontaneous    Mode: [] Auto [] Spontaneous           Auto CPAP: 8-16 cm H2O. Comfort Settings:   - Ramp Pressure: 4 cmH2O                                        - Ramp time: 15 min                                     -  Flex/EPR - 3 full time                                    - For ResMed Bilevel (TiMax-4 sec   TiMin- 0.2 sec)     Humidifier: [x] Heated ()        [] Water chamber replacement ()/ 1 per 6 months        Mask:   [] Nasal () /1 per 3 months [x] Full Face () /1 per 3 months   [] Patient choice -Size and fit mask [x] Patient Choice - Size and fit mask   [] Dispense:  [] Dispense:    [] Headgear () / 1 per 3 months [x] Headgear () / 1 per 3 months   [] Replacement Nasal Cushion ()/2 per month [x] Interface Replacement ()/1 per month   [] Replacement Nasal Pillows ()/2 per month         Tubing: [] Heated ()/1 per 3 months    [x] Standard ()/1 per 3 months [] Other:           Filters: [x] Non-disposable ()/1 per 6 months     [] Ultra-Fine, Disposable ()/2 per month        Miscellaneous: [] Chin Strap ()/ 1 per 6 months [] O2 bleed-in:       LPM   [] Oximetry on CPAP/Bilevel []  Other:          Start Order Date: 20    MEDICAL JUSTIFICATION:  I, the undersigned, certify that the above prescribed supplies are medically necessary for this patients wellbeing. In my opinion, the supplies are both reasonable and necessary in reference to accepted standards of medicalpractice in treatment of this patients condition.     Michelle Cason MD      NPI: [id number]       Order Signed Date: 12/03/20    Electronically signed by Dior Honeycutt MD on 12/3/2020 at 9:31 AM

## 2020-12-07 ENCOUNTER — HOSPITAL ENCOUNTER (OUTPATIENT)
Dept: PHYSICAL THERAPY | Age: 60
Setting detail: THERAPIES SERIES
Discharge: HOME | End: 2020-12-07
Payer: COMMERCIAL

## 2020-12-07 PROCEDURE — 97110 THERAPEUTIC EXERCISES: CPT

## 2020-12-07 PROCEDURE — 97140 MANUAL THERAPY 1/> REGIONS: CPT

## 2020-12-07 NOTE — FLOWSHEET NOTE
[facility]  Phone: [phone]   Fax: [phone]    Physical Therapy Treatment Note/ Progress Report:     Date:  2020    Patient Name:  Bashir Osullivan    :  1960  MRN: [id number]  Restrictions/Precautions:    Medical/Treatment Diagnosis Information:  · Diagnosis: M25.512 (ICD-10-CM) - Left shoulder pain DOS 10/27/20   · Treatment Diagnosis: Decreased ROM & strength of L shoulder following RTC tear and repair on   Insurance/Certification information:  PT Insurance Information: [employer] - Comp management. Claim #[id number];  DOI 10/07/2020. RDU#[phone]  Physician Information:  Referring Practitioner: Adeel Vasques MD  Plan of care signed (Y/N): []  Yes [x]  No     Date of Patient follow up with Physician:      Progress Report: [x]  Yes  []  No     Date Range for reporting period:  Beginnin2020  Ending:     Progress report due (10 Rx/or 30 days whichever is less): visit #9 or  (date)     Recertification due (POC duration/ or 90 days whichever is less): visit #12 or 2020 (date)     Visit # Insurance Allowable Auth required? Date Range   12 []  Yes  []  No C9 expires 20     Latex Allergy:  [x]NO      []YES  Preferred Language for Healthcare:   [x]English       []other:    Functional Scale:       Date assessed:  Quick DASH: raw score = 22; dysfunction = 25%      Pain level: 0/10     SUBJECTIVE:     Said no new c/o.      OBJECTIVE:    Observation:    Test measurements:  : PROM L shoulder flexion: 140 deg    : pt 8 min late    PROM AROM     L R L R   Shoulder Flexion  151 deg   Deferred WFL   Shoulder Scaption 124 deg   Deferred [facility]   Shoulder External Rotation  60 deg at 90 abd   Deferred [facility]   Shoulder Internal Rotation  65 deg at 90 abd   Deferred WFL       RESTRICTIONS/PRECAUTIONS: on : Patient may start passive range of motion exercises of the shoulder only for the next 4 weeks. May then start a active assistive range of motion exercise program of the shoulder. Active range of motion exercises may start at 8 weeks. No     strength training to 3 months postop. May perform active and passive range of motion exercises of the elbow wrist and hand. Exercises/Interventions:   Therapeutic Exercise (26620)  Start time: 1:35 pm  End time: 1:52 pm Resistance / level Sets / Seconds  Reps Notes/Cues   AROM STARTS 1/7/21                     Pulleys Flex/Scaption   X 20     Wand Supine Shoulder Flexion        Wand Shoulder ER       Table Flexion Slides   10\" X 10     Wall slides flex with towel     X 10     Scap squeezes/shoulder rolls    X 25           Pron/Supination     Wrist Flex & Ext Stretch   **Man   Wrist Flex/Ext,RD resisted       Pendulums CW/CCW, flex/ext, horiz abd/add       Bicep curls - palm up  Bicep curls - palm in    L Elbow flex & ext  -shld stabilized at 90 deg flex  -shld stabilized at 80 deg abd     20  20                         Therapeutic Activities (25443)  Start time:   End time:                                           Neuromuscular Re-ed (38929)  Start time:   End time:                                           Manual Intervention (17431)  Start time: 1:52 pm  End time: 2:15 pm       Shld /GH Mobs       Post Cap mobs       Thoracic/Rib manipualtion       CT MT/Mobs       L shoulder PROM into flex, scap, ER & IR in LPP  23 mins                 Pt. Education:  11/17:  -pt educated on diagnosis, prognosis and expectations for rehab  -all pt questions were answered    Home Exercise Program:    Access Code: [email or web address]   URL: [email or web address]   Date: 12/07/2020   Prepared by: Yan Mendoza     Exercises  Supine Shoulder Flexion Extension AAROM with [place]el - 10 reps - 2-3 sets - 2x daily - 7x weekly  Seated Shoulder Flexion Towel Slide at Table Top - 10 reps - 2-3 sets - 5- 10 secs hold - 2x daily - 7x weekly  Scaption Wall Slide with Towel - 10 reps carrying, lifting, house/yardwork, driving/computer work  [] (51496) Reviewed/Progressed HEP activities related to improving balance, coordination, kinesthetic sense, posture, motor skill, proprioception of scapular, scapulothoracic and UE control with self care, reaching, carrying, lifting, house/yardwork, driving/computer work      Manual Treatments:  PROM / STM / Oscillations-Mobs:  G-I, II, III, IV (PA's, Inf., Post.)  [x] (90810) Provided manual therapy to mobilize soft tissue/joints of cervical/CT, scapular GHJ and UE for the purpose of modulating pain, promoting relaxation,  increasing ROM, reducing/eliminating soft tissue swelling/inflammation/restriction, improving soft tissue extensibility and allowing for proper ROM for normal function with self care, reaching, carrying, lifting, house/yardwork, driving/computer work    Modalities:  11/16, 11/20: declined CP  Start time:  End time:     Charges:  Timed Code Treatment Minutes: 39   Total Treatment Minutes: 39     [] EVAL - LOW (30076)   [] EVAL - MOD (10332)  [] EVAL - HIGH (40446)  [] RE-EVAL (43688)  [x] QG(79918) x 1        [] Ionto  [] NMR (92048) x       [] Vaso  [x] Manual (59326) x 2       [] Ultrasound  [] TA x        [] Mech Traction (58307)  [] Aquatic Therapy x      [] ES (un) (76567):   [] Home Management Training x  [] ES(attended) (14933)   [] Dry Needling 1-2 muscles (71651):  [] Dry Needling 3+ muscles (907170  [] Group:      [] Other:                     GOALS:  Patient stated goal: alleviate pain, return to work  []? Progressing: []? Met: []? Not Met: []? Adjusted     Therapist goals for Patient:   Short Term Goals: To be achieved in: 2 weeks  1. Independent in HEP and progression per patient tolerance, in order to prevent re-injury. [x]? Progressing: []? Met: []? Not Met: []? Adjusted  2. Patient will have a decrease in pain to facilitate improvement in movement, function, and ADLs as indicated by Functional Deficits. [x]?  Progressing: []? Met: []? Not Met: []? Adjusted     Long Term Goals: To be achieved in: 12 weeks  1. Disability index score of 5% or less for the Quick DASH to assist with reaching prior level of function. []? Progressing: []? Met: [x]? Not Met: []? Adjusted  2. Patient will demonstrate increased L shoulder AROM to [facility] to allow for proper joint functioning as indicated by patients Functional Deficits. []? Progressing: []? Met: [x]? Not Met: []? Adjusted  3. Patient will demonstrate an increase in L shoulder strength to 4/5 to allow for proper functional mobility as indicated by patients Functional Deficits. []? Progressing: []? Met: [x]? Not Met: []? Adjusted  4. Patient will return to functional activities including sleeping and driving without increased symptoms or restriction. []? Progressing: []? Met: [x]? Not Met: []? Adjusted  5. Patient to lift 25# pain-free in order to return normal work duty unrestricted. []? Progressing: []? Met: [x]? Not Met: []? Adjusted            Overall Progression Towards Functional goals/ Treatment Progress Update:  [] Patient is progressing as expected towards functional goals listed. [] Progression is slowed due to complexities/Impairments listed. [] Progression has been slowed due to co-morbidities. [x] Plan just implemented, too soon to assess goals progression <30days   [] Goals require adjustment due to lack of progress  [] Patient is not progressing as expected and requires additional follow up with physician  [] Other    Persisting Functional Limitations/Impairments:  []Sitting []Standing   []Transfers  []Sleeping   [x]Reaching [x]Lifting   [x]ADLs [x]Housework  [x]Driving [x]Job related tasks  []Sports/Recreation []Other:    ASSESSMENT:   Initiated AAROM exercises today per protocol/MD.  Updated HEP. Good tolerance. Further education on restrictions, and difference between AAROM and AROM.   Will continue to benefit from skilled PT to progress through precautions and

## 2020-12-08 ENCOUNTER — TELEPHONE (OUTPATIENT)
Dept: PULMONOLOGY | Age: 60
End: 2020-12-08

## 2020-12-08 NOTE — TELEPHONE ENCOUNTER
Pt states that he can not have the titration study at this time since he is having issues with his shoulder    The pt will call the office when he is able to do the titration study

## 2020-12-09 ENCOUNTER — OFFICE VISIT (OUTPATIENT)
Dept: CARDIOLOGY CLINIC | Age: 60
End: 2020-12-09
Payer: COMMERCIAL

## 2020-12-09 VITALS
BODY MASS INDEX: 32.37 KG/M2 | HEIGHT: 72 IN | WEIGHT: 239 LBS | DIASTOLIC BLOOD PRESSURE: 68 MMHG | OXYGEN SATURATION: 98 % | SYSTOLIC BLOOD PRESSURE: 120 MMHG | HEART RATE: 106 BPM

## 2020-12-09 PROCEDURE — 99214 OFFICE O/P EST MOD 30 MIN: CPT | Performed by: INTERNAL MEDICINE

## 2020-12-09 RX ORDER — OMEPRAZOLE 20 MG/1
20 CAPSULE, DELAYED RELEASE ORAL
Qty: 90 CAPSULE | Refills: 0 | Status: SHIPPED | OUTPATIENT
Start: 2020-12-09

## 2020-12-09 RX ORDER — VITAMIN E 268 MG
400 CAPSULE ORAL DAILY
COMMUNITY

## 2020-12-09 NOTE — PROGRESS NOTES
[address]    2020    Sapna Cavanaugh [id number] (:  1960) is a 60 y.o. male is here for follow-up and management of CAD and modifiable risk factors. Referring Provider: Jass Diallo MD    HISTORY:  Mr. Ericka Canada has a history of coronary artery disease with PCI and stent placement in . Additional history includes hypertension and hyperlipidemia, SVT s/p AVNRT ablation by Dr. Tala Stratton (19). He was hospitalized at [facility] on 18 with mid-sternal left sided chest pressure associated with shortness of breath, s/p NSTEMI. On 18, he underwent LHC and occluded LAD was revascularized with INNA. He was discharged on ASA and Effient. He was hospitalized on 19 with melana and dizziness and found to have small gastric ulcer and erosive by EGD. He was started on Protonix and DAPT was resumed. Today, he denies chest discomfort, shortness of breath, lightheadedness, dizziness or palpitations. REVIEW OF SYSTEMS:  A complete review of systems was reviewed and is negative except as noted in the history of present illness. Prior to Visit Medications    Medication Sig Taking?  Authorizing Provider   vitamin E 400 UNIT capsule Take 400 Units by mouth daily Yes Historical Provider, MD   atorvastatin (LIPITOR) 40 MG tablet Take 1 tablet by mouth nightly Yes ADAMA Rao CNP   carvedilol (COREG) 3.125 MG tablet Take 1 tablet by mouth 2 times daily  Patient taking differently: Take 3.125 mg by mouth daily  Yes ADAMA Rao CNP   vitamin C (ASCORBIC ACID) 500 MG tablet Take 500 mg by mouth daily Yes Historical Provider, MD   vitamin D (CHOLECALCIFEROL) 1000 UNIT TABS tablet Take 1,000 Units by mouth daily Yes Historical Provider, MD   vitamin B-12 (CYANOCOBALAMIN) 1000 MCG tablet Take 1,000 mcg by mouth daily Yes Historical Provider, MD   Omega-3 Fatty Acids (FISH OIL) 1000 MG CAPS Take 1,000 mg by mouth daily Yes Historical Provider, MD Salazar 1000 MG CAPS Take 1 capsule by mouth daily  Yes Historical Provider, MD   aspirin 81 MG chewable tablet Take 1 tablet by mouth daily Yes Trey Field APRN - CNP   nitroGLYCERIN (NITROSTAT) 0.4 MG SL tablet up to max of 3 total doses. If no relief after 1 dose, call 911. Yes Zurdo Bradford MD        No Known Allergies    Past Medical History:   Diagnosis Date    CAD (coronary artery disease)     Hyperlipidemia     Hypertension        Past Surgical History:   Procedure Laterality Date    CORONARY ANGIOPLASTY WITH STENT PLACEMENT  2011    UPPER GASTROINTESTINAL ENDOSCOPY N/A 1/15/2019    EGD BIOPSY performed by Abdiel De La Paz MD at [address] [place] [address] N/A 1/15/2019    EGD CONTROL HEMORRHAGE performed by Abdiel De La Paz MD at [facility]. [address] History     Tobacco Use    Smoking status: Never Smoker    Smokeless tobacco: Never Used   Substance Use Topics    Alcohol use: No        No family history on file. PHYSICAL EXAMINATION:  Vitals:    12/09/20 1539   BP: 120/68   Site: Right Upper Arm   Position: Sitting   Cuff Size: Large Adult   Pulse: 106   SpO2: 98%   Weight: 239 lb (108.4 kg)   Height: 6' (1.829 m)     Estimated body mass index is 32.41 kg/m² as calculated from the following:    Height as of this encounter: 6' (1.829 m). Weight as of this encounter: 239 lb (108.4 kg). Physical Exam  Constitutional:       Appearance: He is well-developed. He is not diaphoretic. HENT:      Head: Normocephalic and atraumatic. Eyes:      General: No scleral icterus. Extraocular Movements: Extraocular movements intact. Conjunctiva/sclera: Conjunctivae normal.   Neck:      Musculoskeletal: Normal range of motion and neck supple. Vascular: No JVD. Cardiovascular:      Rate and Rhythm: Normal rate and regular rhythm. Heart sounds: Normal heart sounds. No murmur. No friction rub. No gallop.     Pulmonary:      Effort: Pulmonary effort is normal. No respiratory distress. Breath sounds: Normal breath sounds. No wheezing or rales. Abdominal:      General: Bowel sounds are normal.      Palpations: Abdomen is soft. Tenderness: There is no abdominal tenderness. Musculoskeletal: Normal range of motion. Skin:     General: Skin is warm and dry. Findings: No rash. Neurological:      General: No focal deficit present. Mental Status: He is alert and oriented to person, place, and time. Psychiatric:         Mood and Affect: Mood normal.         Behavior: Behavior normal.         Thought Content: Thought content normal.         Judgment: Judgment normal.           I have reviewed all pertinent lab results and diagnostic testing. Lab Results   Component Value Date    CHOL 138 09/16/2019    TRIG 95 09/16/2019    HDL 56 09/16/2019    LDLCALC 63 09/16/2019     Lab Results   Component Value Date     09/16/2019    K 4.6 09/16/2019    K 4.3 02/12/2019     09/16/2019    CO2 25 09/16/2019    GLUCOSE 107 09/16/2019    BUN 19 09/16/2019    CREATININE 0.7 09/16/2019    CALCIUM 9.4 09/16/2019    GFRAA >60 09/16/2019     Lab Results   Component Value Date    ALT 15 09/16/2019    AST 15 09/16/2019     Echo 9/16/19:  Summary   -Normal left ventricle size, moderate wall thickness and normal systolic   function with an estimated ejection fraction of 55%.   -No regional wall motion abnormalities are seen. -Grade I diastolic dysfunction with normal LV filling pressures. E/e\"=10.55.   -Mild mitral regurgitation.   -Trivial tricuspid regurgitation.   -Estimated pulmonary artery systolic pressure is normal at 16-21 mmHg   assuming a right atrial pressure of 3 mmHg.        ECHO: 7/9/18  Normal left ventricle size, wall thickness and systolic function with an EF   of 55%   Although not well visualized, there is possible mild hyopkinesis of the   apicoseptal wall   Diastolic filling parameters suggest grade I diastolic

## 2020-12-09 NOTE — LETTER
[address] Cardiology [address] [address]  Phone: [phone]  Fax: [phone]    King Ty MD        2020     Rosana Lesches, [address] [address]    Patient: Tita Butler  MR Number: [id number]  YOB: 1960  Date of Visit: 2020    Dear Dr. Rosana Lesches:    Below are the relevant portions of my assessment and plan of care. [address]    2020    Sapna Cavanaugh [address] (:  1960) is a 60 y.o. male is here for follow-up and management of CAD and modifiable risk factors. Referring Provider: Rosana Lesches, MD    HISTORY:  Mr. Ericka Canada has a history of coronary artery disease with PCI and stent placement in . Additional history includes hypertension and hyperlipidemia, SVT s/p AVNRT ablation by Dr. Imtiaz De Leon (19). He was hospitalized at [facility] on 18 with mid-sternal left sided chest pressure associated with shortness of breath, s/p NSTEMI. On 18, he underwent LHC and occluded LAD was revascularized with INNA. He was discharged on ASA and Effient. He was hospitalized on 19 with melana and dizziness and found to have small gastric ulcer and erosive by EGD. He was started on Protonix and DAPT was resumed. Today, he denies chest discomfort, shortness of breath, lightheadedness, dizziness or palpitations. REVIEW OF SYSTEMS:  A complete review of systems was reviewed and is negative except as noted in the history of present illness. Prior to Visit Medications    Medication Sig Taking?  Authorizing Provider   vitamin E 400 UNIT capsule Take 400 Units by mouth daily Yes Historical Provider, MD   atorvastatin (LIPITOR) 40 MG tablet Take 1 tablet by mouth nightly Yes ADAMA Gonzales - CNP   carvedilol (COREG) 3.125 MG tablet Take 1 tablet by mouth 2 times daily Patient taking differently: Take 3.125 mg by mouth daily  Yes Sunshine Zambrano APRN - CNP   vitamin C (ASCORBIC ACID) 500 MG tablet Take 500 mg by mouth daily Yes Historical Provider, MD   vitamin D (CHOLECALCIFEROL) 1000 UNIT TABS tablet Take 1,000 Units by mouth daily Yes Historical Provider, MD   vitamin B-12 (CYANOCOBALAMIN) 1000 MCG tablet Take 1,000 mcg by mouth daily Yes Historical Provider, MD   Omega-3 Fatty Acids (FISH OIL) 1000 MG CAPS Take 1,000 mg by mouth daily Yes Historical Provider, MD   Garlic 9944 MG CAPS Take 1 capsule by mouth daily  Yes Historical Provider, MD   aspirin 81 MG chewable tablet Take 1 tablet by mouth daily Yes Jae Kate APRN - CNP   nitroGLYCERIN (NITROSTAT) 0.4 MG SL tablet up to max of 3 total doses. If no relief after 1 dose, call 911. Yes Charline Matthew MD        No Known Allergies    Past Medical History:   Diagnosis Date    CAD (coronary artery disease)     Hyperlipidemia     Hypertension        Past Surgical History:   Procedure Laterality Date    CORONARY ANGIOPLASTY WITH STENT PLACEMENT  2011    UPPER GASTROINTESTINAL ENDOSCOPY N/A 1/15/2019    EGD BIOPSY performed by Yony Gomez MD at [address] N/A 1/15/2019    EGD CONTROL HEMORRHAGE performed by Yony Gomez MD at [address] History     Tobacco Use    Smoking status: Never Smoker    Smokeless tobacco: Never Used   Substance Use Topics    Alcohol use: No        No family history on file. PHYSICAL EXAMINATION:  Vitals:    12/09/20 1539   BP: 120/68   Site: Right Upper Arm   Position: Sitting   Cuff Size: Large Adult   Pulse: 106   SpO2: 98%   Weight: 239 lb (108.4 kg)   Height: 6' (1.829 m)     Estimated body mass index is 32.41 kg/m² as calculated from the following:    Height as of this encounter: 6' (1.829 m). Weight as of this encounter: 239 lb (108.4 kg).     Physical Exam  Constitutional: Appearance: He is well-developed. He is not diaphoretic. HENT:      Head: Normocephalic and atraumatic. Eyes:      General: No scleral icterus. Extraocular Movements: Extraocular movements intact. Conjunctiva/sclera: Conjunctivae normal.   Neck:      Musculoskeletal: Normal range of motion and neck supple. Vascular: No JVD. Cardiovascular:      Rate and Rhythm: Normal rate and regular rhythm. Heart sounds: Normal heart sounds. No murmur. No friction rub. No gallop. Pulmonary:      Effort: Pulmonary effort is normal. No respiratory distress. Breath sounds: Normal breath sounds. No wheezing or rales. Abdominal:      General: Bowel sounds are normal.      Palpations: Abdomen is soft. Tenderness: There is no abdominal tenderness. Musculoskeletal: Normal range of motion. Skin:     General: Skin is warm and dry. Findings: No rash. Neurological:      General: No focal deficit present. Mental Status: He is alert and oriented to person, place, and time. Psychiatric:         Mood and Affect: Mood normal.         Behavior: Behavior normal.         Thought Content: Thought content normal.         Judgment: Judgment normal.           I have reviewed all pertinent lab results and diagnostic testing. Lab Results   Component Value Date    CHOL 138 09/16/2019    TRIG 95 09/16/2019    HDL 56 09/16/2019    LDLCALC 63 09/16/2019     Lab Results   Component Value Date     09/16/2019    K 4.6 09/16/2019    K 4.3 02/12/2019     09/16/2019    CO2 25 09/16/2019    GLUCOSE 107 09/16/2019    BUN 19 09/16/2019    CREATININE 0.7 09/16/2019    CALCIUM 9.4 09/16/2019    GFRAA >60 09/16/2019     Lab Results   Component Value Date    ALT 15 09/16/2019    AST 15 09/16/2019     Echo 9/16/19:  Summary   -Normal left ventricle size, moderate wall thickness and normal systolic   function with an estimated ejection fraction of 55%. -No regional wall motion abnormalities are seen. -Grade I diastolic dysfunction with normal LV filling pressures. E/e\"=10.55.   -Mild mitral regurgitation.   -Trivial tricuspid regurgitation.   -Estimated pulmonary artery systolic pressure is normal at 16-21 mmHg   assuming a right atrial pressure of 3 mmHg. ECHO: 7/9/18  Normal left ventricle size, wall thickness and systolic function with an EF   of 55%   Although not well visualized, there is possible mild hyopkinesis of the   apicoseptal wall   Diastolic filling parameters suggest grade I diastolic dysfunction.   I/J'=0.8.   Trivial tricuspid regurgitation with RVSP of 31 mmHg.     Cardiac Cath 7/9/18:  Anatomy:   LM-normal  LAD-patent proximal stent, 100% mid  Cx-20% proximal and mid  OM1- patent  RCA-10% distal diffuse, codominant  RPDA- patent  LVEF- 40% with mid to distal anterior hypokinesis  PCI: % to 0% with a 2.75 mm x 16 mm [employer]nergy INNA.    Impression:  1.  Critically occluded mid LAD with successful revascularization using INNA. 2.  Mild to moderate LV systolic dysfunction.     Plan:  1.  Aspirin 81 mg daily indefinitely  2.  Effient/equivalent for minimum of 1 year. 3.  Aspirin 81 mg daily, Effient 10 mg daily, Lipitor 40 mg nightly, Toprol-XL 25 mg daily, lisinopril 5 mg by mouth daily.  Compliance with medical regimen discussed in detail with patient and daughter pre-and postprocedure.        ASSESSMENT/PLAN:  1. Coronary artery disease due to lipid rich plaque  2. NSTEMI (non-ST elevated myocardial infarction) ([address]  3. Status post insertion of drug-eluting stent into left anterior descending (LAD) artery  -  hx of PCI with stenting to LAD in 2011  -  7/8/19 s/p NSTEMI. [facility] 7/9/19 - s/p INNA to mid LAD, mild nonobstructive diseaese of LCx, RCA. LVEF 40% per LVG  - 7/9/19 Echo - LVEF 55%. - on ASA, Coreg, statin    Plan: Stable. Continue current medical regimen.       4. Benign essential HTN - taken off Lisinopril and switched to Coreg 3.125 mg BID  -  Blood pressure 120/68, pulse 106, height 6' (1.829 m), weight 239 lb (108.4 kg), SpO2 98 %. Plan: Well-controlled. 5. Mixed hyperlipidemia  -  7/2018 lipids - TC- 172, TG- 172, HDL- 36, LDL- 102 at time of NSTEMI and PCI. - Atorvastatin 40 mg, Fish oil 1 gm rodriguez d  - 9/16/19 - TC - 138, TG- 95, HDL- 56, LDL- 63.      Plan : At goal.  Continue statin. 6. SVT  - hospitalized with SVT in Feb 2019. s/p AVNRT ablation by Dr. Cheryl Chavez on 2/12/19     Plan: No recurrence. Successful ablation by Dr. Cheryl Chavez. Plan:  1. Cardiac status stable. 2.  RTC in 1 year. Scribe's attestation: This note was scribed in the presence of Whitney Crawford M.D. by Colletta Chris, RN      Physician Attestation: The scribe's documentation has been prepared under my direction and personally reviewed by me in its entirety. I confirm that the note above accurately reflects all work, treatment, procedures, and medical decision making performed by me. An  electronic signature was used to authenticate this note. Dianne Hugger Karla Cockayne, MD, Marla Corbett      If you have questions, please do not hesitate to call me. I look forward to following Sapna along with you.     Sincerely,        Whitney Crawford MD

## 2020-12-10 ENCOUNTER — HOSPITAL ENCOUNTER (OUTPATIENT)
Dept: PHYSICAL THERAPY | Age: 60
Setting detail: THERAPIES SERIES
Discharge: HOME | End: 2020-12-10
Payer: COMMERCIAL

## 2020-12-10 PROCEDURE — 97110 THERAPEUTIC EXERCISES: CPT

## 2020-12-10 PROCEDURE — 97140 MANUAL THERAPY 1/> REGIONS: CPT

## 2020-12-10 NOTE — FLOWSHEET NOTE
[facility]  Phone: [phone]   Fax: [phone]    Physical Therapy Treatment Note/ Progress Report:     Date:  12/10/2020    Patient Name:  Taylor Banda    :  1960  MRN: [id number]  Restrictions/Precautions:    Medical/Treatment Diagnosis Information:  · Diagnosis: M25.512 (ICD-10-CM) - Left shoulder pain DOS 10/27/20   · Treatment Diagnosis: Decreased ROM & strength of L shoulder following RTC tear and repair on   Insurance/Certification information:  PT Insurance Information: [employer] - Comp management. Claim #[id number];  DOI 10/07/2020. FFY#[phone]  Physician Information:  Referring Practitioner: Sarah Kerr MD  Plan of care signed (Y/N): []  Yes [x]  No     Date of Patient follow up with Physician: ,      Progress Report: [x]  Yes  []  No     Date Range for reporting period:  Beginnin2020  Endin/10    Progress report due (10 Rx/or 30 days whichever is less): visit #10 or  (date)     Recertification due (POC duration/ or 90 days whichever is less): visit #12 or 2020 (date)     Visit # Insurance Allowable Auth required? Date Range   12 []  Yes  []  No C9 expires 20     Latex Allergy:  [x]NO      []YES  Preferred Language for Healthcare:   [x]English       []other:    Functional Scale:       Date assessed:  Quick DASH: raw score = 22; dysfunction = 25%      Pain level: 0/10     SUBJECTIVE:    States that he felt good immediately following treatment but next day woke up with a lot of pain, pain persisted for a couple days before getting back to normal today. Denies pain currently. Wasn't able to perform HEP day after treatment.          OBJECTIVE:    Observation:    Test measurements:     12/10: AAROM L shoulder on pullies:  Flexion 125 deg, abd deg   : PROM L shoulder flexion: 140 deg    : pt 8 min late    PROM AROM     L R L R   Shoulder Flexion 151 deg   Deferred [facility]   Shoulder Scaption 124 deg   Deferred [facility]   Shoulder External Rotation  60 deg at 90 abd   Deferred [facility]   Shoulder Internal Rotation  65 deg at 90 abd   Deferred WFL       RESTRICTIONS/PRECAUTIONS: on 11/05: Patient may start passive range of motion exercises of the shoulder only for the next 4 weeks. May then start a active assistive range of motion exercise program of the shoulder. Active range of motion exercises may start at 8 weeks. No     strength training to 3 months postop. May perform active and passive range of motion exercises of the elbow wrist and hand. Exercises/Interventions:   Therapeutic Exercise (34313)  Start time: 1:34 pm  End time: 1:57 pm Resistance / level Sets / Seconds  Reps Notes/Cues   AROM STARTS 1/7/21                     Pulleys Flex/Scaption   X 20 ea    Wand Supine Shoulder Flexion    x22    Wand Shoulder ER   x15    Table Flexion Slides      Wall slides flex     X 15     Scap squeezes/shoulder rolls -supine   X 25           Pron/Supination     Wrist Flex & Ext Stretch   **Man   Wrist Flex/Ext,RD resisted       Pendulums CW/CCW, flex/ext, horiz abd/add       Bicep curls - palm up  Bicep curls - palm in    L Elbow flex & ext  -shld stabilized at 90 deg flex  -shld stabilized at 80 deg abd     20  20                         Therapeutic Activities (51195)  Start time:   End time:                                           Neuromuscular Re-ed (83998)  Start time:   End time:                                           Manual Intervention (80791)  Start time: 1:58 pm  End time: 2:10 pm       Shld /GH Mobs       Post Cap mobs       Thoracic/Rib manipualtion       CT MT/Mobs       L shoulder PROM into flex, scap, ER & IR in LPP  23 mins                 Pt. Education:  11/17:  -pt educated on diagnosis, prognosis and expectations for rehab  -all pt questions were answered    Home Exercise Program:    Access Code: [email or web address]   URL: [email or web address]   Date: 12/07/2020   Prepared by: Venice Cons     Exercises  Supine Shoulder Flexion Extension AAROM with Dowel - 10 reps - 2-3 sets - 2x daily - 7x weekly  Seated Shoulder Flexion Towel Slide at Table Top - 10 reps - 2-3 sets - 5- 10 secs hold - 2x daily - 7x weekly  Scaption Wall Slide with Towel - 10 reps - 2-3 sets - 2x daily - 7x weekly      Access Code: [email or web address]   URL: [email or web address]   Date: 11/16/2020   Prepared by: Sim Pump     Exercises   · Seated Scapular Retraction - 10 reps - 3x daily - 7x weekly   · Seated Elbow Flexion and Extension AROM - 10 reps - 3x daily - 7x weekly   · Seated Forearm Pronation and Supination AROM - 10 reps - 3x daily - 7x weekly   · Seated Wrist Flexion with Overpressure - 10 reps - 3x daily - 7x weekly   · Seated Wrist Flexion Extension PROM - 10 reps - 3x daily - 7x weekly   · Flexion-Extension Shoulder Pendulum with Table Support - 10 reps - 3x daily - 7x weekly   · Horizontal Shoulder Pendulum with Table Support - 10 reps - 3x daily - 7x weekly   · Circular Shoulder Pendulum with Table Support - 10 reps - 3x daily - 7x weekly        Therapeutic Exercise and NMR EXR  [x] (76683) Provided verbal/tactile cueing for activities related to strengthening, flexibility, endurance, ROM  for improvements in scapular, scapulothoracic and UE control with self care, reaching, carrying, lifting, house/yardwork, driving/computer work.    [] (24117) Provided verbal/tactile cueing for activities related to improving balance, coordination, kinesthetic sense, posture, motor skill, proprioception  to assist with  scapular, scapulothoracic and UE control with self care, reaching, carrying, lifting, house/yardwork, driving/computer work.     Therapeutic Activities:    [] (39285 or 22455) Provided verbal/tactile cueing for activities related to improving balance, coordination, kinesthetic sense, posture, motor skill, proprioception and motor activation to allow for proper function of scapular, scapulothoracic and UE control with self care, carrying, lifting, driving/computer work. Home Exercise Program:    [x] (73501) Reviewed/Progressed HEP activities related to strengthening, flexibility, endurance, ROM of scapular, scapulothoracic and UE control with self care, reaching, carrying, lifting, house/yardwork, driving/computer work  [] (16502) Reviewed/Progressed HEP activities related to improving balance, coordination, kinesthetic sense, posture, motor skill, proprioception of scapular, scapulothoracic and UE control with self care, reaching, carrying, lifting, house/yardwork, driving/computer work      Manual Treatments:  PROM / STM / Oscillations-Mobs:  G-I, II, III, IV (PA's, Inf., Post.)  [x] (23518) Provided manual therapy to mobilize soft tissue/joints of cervical/CT, scapular GHJ and UE for the purpose of modulating pain, promoting relaxation,  increasing ROM, reducing/eliminating soft tissue swelling/inflammation/restriction, improving soft tissue extensibility and allowing for proper ROM for normal function with self care, reaching, carrying, lifting, house/yardwork, driving/computer work    Modalities:  12/10:  Encouraged pt to ice at home to better manage pain and swelling. 11/16, 11/20: declined CP  Start time:  End time:     Charges:  Timed Code Treatment Minutes: 36   Total Treatment Minutes: 36     [] EVAL - LOW (35400)   [] EVAL - MOD (41319)  [] EVAL - HIGH (03704)  [] RE-EVAL (49476)  [x] LE(01060) x 1        [] Ionto  [] NMR (32304) x       [] Vaso  [x] Manual (82660) x 1       [] Ultrasound  [] TA x        [] Mech Traction (63609)  [] Aquatic Therapy x      [] ES (un) (59593):   [] Home Management Training x  [] ES(attended) (42069)   [] Dry Needling 1-2 muscles (86885):  [] Dry Needling 3+ muscles (928509  [] Group:      [] Other:                     GOALS:  Patient stated goal: alleviate pain, return to work  []? Progressing: []? Met: []?  Not Met: []? Adjusted     Therapist goals for Patient:   Short Term Goals: To be achieved in: 2 weeks  1. Independent in HEP and progression per patient tolerance, in order to prevent re-injury. [x]? Progressing: []? Met: []? Not Met: []? Adjusted  2. Patient will have a decrease in pain to facilitate improvement in movement, function, and ADLs as indicated by Functional Deficits. [x]? Progressing: []? Met: []? Not Met: []? Adjusted     Long Term Goals: To be achieved in: 12 weeks  1. Disability index score of 5% or less for the Quick DASH to assist with reaching prior level of function. []? Progressing: []? Met: [x]? Not Met: []? Adjusted  2. Patient will demonstrate increased L shoulder AROM to [facility] to allow for proper joint functioning as indicated by patients Functional Deficits. []? Progressing: []? Met: [x]? Not Met: []? Adjusted  3. Patient will demonstrate an increase in L shoulder strength to 4/5 to allow for proper functional mobility as indicated by patients Functional Deficits. []? Progressing: []? Met: [x]? Not Met: []? Adjusted  4. Patient will return to functional activities including sleeping and driving without increased symptoms or restriction. []? Progressing: []? Met: [x]? Not Met: []? Adjusted  5. Patient to lift 25# pain-free in order to return normal work duty unrestricted. []? Progressing: []? Met: [x]? Not Met: []? Adjusted            Overall Progression Towards Functional goals/ Treatment Progress Update:  [] Patient is progressing as expected towards functional goals listed. [] Progression is slowed due to complexities/Impairments listed. [] Progression has been slowed due to co-morbidities.   [x] Plan just implemented, too soon to assess goals progression <30days   [] Goals require adjustment due to lack of progress  [] Patient is not progressing as expected and requires additional follow up with physician  [] Other    Persisting Functional Limitations/Impairments:  []Sitting []Standing   []Transfers  []Sleeping   [x]Reaching [x]Lifting   [x]ADLs [x]Housework  [x]Driving [x]Job related tasks  []Sports/Recreation []Other:    ASSESSMENT:   Tolerated AAROM well, however GH ER PROM appears more restricted limiting flexion without compensations. Pt plans to begin icing more regularly throughout the day to manage pain. Continue to progress AAROM and PROM as tolerated. Treatment/Activity Tolerance:  [x] Pt able to complete treatment [] Patient limited by fatique  [] Patient limited by pain  [] Patient limited by other medical complications  [] Other:     Prognosis:  [x] Good [] Fair  [] Poor    Patient Requires Follow-up: [x] Yes  [] No    Return to Play:    [x]  N/A     []  Stage 1: Intro to Strength   []  Stage 2: Dynamic Strength and Intro to Plyometrics   []  Stage 3: Advanced Plyometrics and Intro to Throwing   []  Stage 4: Sport specific Training/Return to Sport     []  Ready to Return to Play, [employer]   []  Not Ready for Return to Sports   Comments:      PLAN: See eval. PT 2x / week for 6 weeks. [x] Continue per plan of care [] Alter current plan (see comments)  [] Plan of care initiated [] Hold pending MD visit [] Discharge    Electronically signed by: Mark Anthony Ann PT      Note: If patient does not return for scheduled/ recommended follow up visits, this note will serve as a discharge from care along with most recent update on progress.

## 2020-12-14 ENCOUNTER — HOSPITAL ENCOUNTER (OUTPATIENT)
Dept: PHYSICAL THERAPY | Age: 60
Setting detail: THERAPIES SERIES
Discharge: HOME | End: 2020-12-14
Payer: COMMERCIAL

## 2020-12-14 PROCEDURE — 97110 THERAPEUTIC EXERCISES: CPT

## 2020-12-14 PROCEDURE — 97140 MANUAL THERAPY 1/> REGIONS: CPT

## 2020-12-14 NOTE — FLOWSHEET NOTE
[facility]  Phone: [phone]   Fax: [phone]    Physical Therapy Treatment Note/ Progress Report:     Date:  2020    Patient Name:  Perry Hernandez    :  1960  MRN: [id number]  Restrictions/Precautions:    Medical/Treatment Diagnosis Information:  · Diagnosis: M25.512 (ICD-10-CM) - Left shoulder pain DOS 10/27/20   · Treatment Diagnosis: Decreased ROM & strength of L shoulder following RTC tear and repair on   Insurance/Certification information:  PT Insurance Information: [employer] - Comp management. Claim #[id number];  DOI 10/07/2020. PGL#[phone]  Physician Information:  Referring Practitioner: Goldie Aggarwal MD  Plan of care signed (Y/N): []  Yes [x]  No     Date of Patient follow up with Physician: ,      Progress Report: [x]  Yes  []  No     Date Range for reporting period:  Beginnin2020  Endin/10    Progress report due (10 Rx/or 30 days whichever is less): visit #10 or  (date)     Recertification due (POC duration/ or 90 days whichever is less): visit #12 or 2020 (date)     Visit # Insurance Allowable Auth required? Date Range   12 []  Yes  []  No C9 expires 20     Latex Allergy:  [x]NO      []YES  Preferred Language for Healthcare:   [x]English       []other:    Functional Scale:       Date assessed:  Quick DASH: raw score = 22; dysfunction = 25%      Pain level: 0/10     SUBJECTIVE:   Says he has recovered and not currently sore any more, the normal soreness.           OBJECTIVE:    Observation:    Test measurements:     12/10: AAROM L shoulder on pullies:  Flexion 125 deg, abd deg   : PROM L shoulder flexion: 140 deg    : pt 8 min late    PROM AROM     L R L R   Shoulder Flexion  151 deg   Deferred WFL   Shoulder Scaption 124 deg   Deferred [facility]   Shoulder External Rotation  60 deg at 90 abd   Deferred [facility]   Shoulder Internal Rotation  65 deg at 90 abd   Deferred WFL       RESTRICTIONS/PRECAUTIONS: on 11/05: Patient may start passive range of motion exercises of the shoulder only for the next 4 weeks. May then start a active assistive range of motion exercise program of the shoulder. Active range of motion exercises may start at 8 weeks. No     strength training to 3 months postop. May perform active and passive range of motion exercises of the elbow wrist and hand. Exercises/Interventions:   Therapeutic Exercise (11643)  Start time: 12:10 pm  End time: 12:33 pm Resistance / level Sets / Seconds  Reps Notes/Cues   AROM STARTS 1/7/21                     Pulleys Flex/Scaption   X 20 ea    Wand Supine Shoulder Flexion    x22    Wand Shoulder ER   x15    Table Flexion Slides      Wall slides flex     X 15     Scap squeezes/shoulder rolls -supine   X 25           Pron/Supination     Wrist Flex & Ext Stretch   **Man   Wrist Flex/Ext,RD resisted       Pendulums CW/CCW, flex/ext, horiz abd/add       Bicep curls - palm up  Bicep curls - palm in    L Elbow flex & ext  -shld stabilized at 90 deg flex  -shld stabilized at 80 deg abd     20  20                         Therapeutic Activities (16706)  Start time:   End time:                                           Neuromuscular Re-ed (40872)  Start time:   End time:                                           Manual Intervention (12583)  Start time: 12:33 pm  End time: 12:48 pm       Shld /GH Mobs       Post Cap mobs       Thoracic/Rib manipualtion       CT MT/Mobs       L shoulder PROM into flex, scap, ER & IR in LPP  15 mins                 Pt. Education:  11/17:  -pt educated on diagnosis, prognosis and expectations for rehab  -all pt questions were answered    Home Exercise Program:    Access Code: [email or web address]   URL: [email or web address]   Date: 12/07/2020   Prepared by: Ana Marcus     Exercises  Supine Shoulder Flexion Extension AAROM with Dowel - 10 reps - 2-3 sets - 2x daily - 7x weekly  Seated Shoulder Flexion Towel Slide at Table Top - 10 reps - 2-3 sets - 5- 10 secs hold - 2x daily - 7x weekly  Scaption Wall Slide with Towel - 10 reps - 2-3 sets - 2x daily - 7x weekly      Access Code: [email or web address]   URL: [email or web address]   Date: 11/16/2020   Prepared by: Shayan Ro     Exercises   · Seated Scapular Retraction - 10 reps - 3x daily - 7x weekly   · Seated Elbow Flexion and Extension AROM - 10 reps - 3x daily - 7x weekly   · Seated Forearm Pronation and Supination AROM - 10 reps - 3x daily - 7x weekly   · Seated Wrist Flexion with Overpressure - 10 reps - 3x daily - 7x weekly   · Seated Wrist Flexion Extension PROM - 10 reps - 3x daily - 7x weekly   · Flexion-Extension Shoulder Pendulum with Table Support - 10 reps - 3x daily - 7x weekly   · Horizontal Shoulder Pendulum with Table Support - 10 reps - 3x daily - 7x weekly   · Circular Shoulder Pendulum with Table Support - 10 reps - 3x daily - 7x weekly        Therapeutic Exercise and NMR EXR  [x] (41475) Provided verbal/tactile cueing for activities related to strengthening, flexibility, endurance, ROM  for improvements in scapular, scapulothoracic and UE control with self care, reaching, carrying, lifting, house/yardwork, driving/computer work.    [] (07416) Provided verbal/tactile cueing for activities related to improving balance, coordination, kinesthetic sense, posture, motor skill, proprioception  to assist with  scapular, scapulothoracic and UE control with self care, reaching, carrying, lifting, house/yardwork, driving/computer work. Therapeutic Activities:    [] (49523 or 93459) Provided verbal/tactile cueing for activities related to improving balance, coordination, kinesthetic sense, posture, motor skill, proprioception and motor activation to allow for proper function of scapular, scapulothoracic and UE control with self care, carrying, lifting, driving/computer work.      Home Exercise Program:    [x] (74841) Reviewed/Progressed HEP activities related to strengthening, flexibility, endurance, ROM of scapular, scapulothoracic and UE control with self care, reaching, carrying, lifting, house/yardwork, driving/computer work  [] (94810) Reviewed/Progressed HEP activities related to improving balance, coordination, kinesthetic sense, posture, motor skill, proprioception of scapular, scapulothoracic and UE control with self care, reaching, carrying, lifting, house/yardwork, driving/computer work      Manual Treatments:  PROM / STM / Oscillations-Mobs:  G-I, II, III, IV (PA's, Inf., Post.)  [x] (35328) Provided manual therapy to mobilize soft tissue/joints of cervical/CT, scapular GHJ and UE for the purpose of modulating pain, promoting relaxation,  increasing ROM, reducing/eliminating soft tissue swelling/inflammation/restriction, improving soft tissue extensibility and allowing for proper ROM for normal function with self care, reaching, carrying, lifting, house/yardwork, driving/computer work    Modalities:  12/10:  Encouraged pt to ice at home to better manage pain and swelling. 11/16, 11/20: declined CP  Start time:  End time:     Charges:  Timed Code Treatment Minutes: 38   Total Treatment Minutes: 38     [] EVAL - LOW (21218)   [] EVAL - MOD (02519)  [] EVAL - HIGH (95466)  [] RE-EVAL (90309)  [x] WJ(55599) x 2       [] Ionto  [] NMR (24192) x       [] Vaso  [x] Manual (99649) x 1       [] Ultrasound  [] TA x        [] Mech Traction (95081)  [] Aquatic Therapy x      [] ES (un) (81677):   [] Home Management Training x  [] ES(attended) (14736)   [] Dry Needling 1-2 muscles (55430):  [] Dry Needling 3+ muscles (555347  [] Group:      [] Other:                     GOALS:  Patient stated goal: alleviate pain, return to work  []? Progressing: []? Met: []? Not Met: []? Adjusted     Therapist goals for Patient:   Short Term Goals: To be achieved in: 2 weeks  1.  Independent in HEP and progression per patient tolerance, in order to prevent re-injury. [x]? Progressing: []? Met: []? Not Met: []? Adjusted  2. Patient will have a decrease in pain to facilitate improvement in movement, function, and ADLs as indicated by Functional Deficits. [x]? Progressing: []? Met: []? Not Met: []? Adjusted     Long Term Goals: To be achieved in: 12 weeks  1. Disability index score of 5% or less for the Quick DASH to assist with reaching prior level of function. []? Progressing: []? Met: [x]? Not Met: []? Adjusted  2. Patient will demonstrate increased L shoulder AROM to [facility] to allow for proper joint functioning as indicated by patients Functional Deficits. []? Progressing: []? Met: [x]? Not Met: []? Adjusted  3. Patient will demonstrate an increase in L shoulder strength to 4/5 to allow for proper functional mobility as indicated by patients Functional Deficits. []? Progressing: []? Met: [x]? Not Met: []? Adjusted  4. Patient will return to functional activities including sleeping and driving without increased symptoms or restriction. []? Progressing: []? Met: [x]? Not Met: []? Adjusted  5. Patient to lift 25# pain-free in order to return normal work duty unrestricted. []? Progressing: []? Met: [x]? Not Met: []? Adjusted            Overall Progression Towards Functional goals/ Treatment Progress Update:  [] Patient is progressing as expected towards functional goals listed. [] Progression is slowed due to complexities/Impairments listed. [] Progression has been slowed due to co-morbidities.   [x] Plan just implemented, too soon to assess goals progression <30days   [] Goals require adjustment due to lack of progress  [] Patient is not progressing as expected and requires additional follow up with physician  [] Other    Persisting Functional Limitations/Impairments:  []Sitting []Standing   []Transfers  []Sleeping   [x]Reaching [x]Lifting   [x]ADLs [x]Housework  [x]Driving [x]Job related tasks  []Sports/Recreation []Other:    ASSESSMENT: Tolerated AAROM well, however [address] ER PROM appears more restricted limiting flexion without compensations. Pt plans to begin icing more regularly throughout the day to manage pain. Continue to progress AAROM and PROM as tolerated. Treatment/Activity Tolerance:  [x] Pt able to complete treatment [] Patient limited by fatique  [] Patient limited by pain  [] Patient limited by other medical complications  [] Other:     Prognosis:  [x] Good [] Fair  [] Poor    Patient Requires Follow-up: [x] Yes  [] No    Return to Play:    [x]  N/A     []  Stage 1: Intro to Strength   []  Stage 2: Dynamic Strength and Intro to Plyometrics   []  Stage 3: Advanced Plyometrics and Intro to Throwing   []  Stage 4: Sport specific Training/Return to Sport     []  Ready to Return to Play, [employer]   []  Not Ready for Return to Sports   Comments:      PLAN: See eval. PT 2x / week for 6 weeks. [x] Continue per plan of care [] Alter current plan (see comments)  [] Plan of care initiated [] Hold pending MD visit [] Discharge    Electronically signed by: Robby Kumar PT      Note: If patient does not return for scheduled/ recommended follow up visits, this note will serve as a discharge from care along with most recent update on progress.

## 2020-12-17 ENCOUNTER — HOSPITAL ENCOUNTER (OUTPATIENT)
Dept: PHYSICAL THERAPY | Age: 60
Setting detail: THERAPIES SERIES
Discharge: HOME | End: 2020-12-17
Payer: COMMERCIAL

## 2020-12-17 PROCEDURE — 97140 MANUAL THERAPY 1/> REGIONS: CPT

## 2020-12-17 PROCEDURE — 97110 THERAPEUTIC EXERCISES: CPT

## 2020-12-17 NOTE — FLOWSHEET NOTE
Gilles Tellez  Phone: [phone]   Fax: [phone]    Physical Therapy Treatment Note/ Progress Report:     Date:  2020    Patient Name:  Lima Contreras    :  1960  MRN: [id number]  Restrictions/Precautions:    Medical/Treatment Diagnosis Information:  · Diagnosis: M25.512 (ICD-10-CM) - Left shoulder pain DOS 10/27/20   · Treatment Diagnosis: Decreased ROM & strength of L shoulder following RTC tear and repair on   Insurance/Certification information:  PT Insurance Information: [employer] - Comp management. Claim #[id number];  DOI 10/07/2020. MATHIEU#[phone]  Physician Information:  Referring Practitioner: Sujatha Cadena MD  Plan of care signed (Y/N): []  Yes [x]  No     Date of Patient follow up with Physician: ,      Progress Report: []  Yes  [x]  No     Date Range for reporting period:  Beginnin/10  Ending:      Progress report due (10 Rx/or 30 days whichever is less): visit #10 or  (date)     Recertification due (POC duration/ or 90 days whichever is less): visit #12 or 2020 (date)     Visit # Insurance Allowable Auth required? Date Range   10/12 12 []  Yes  []  No C9 expires 20     Latex Allergy:  [x]NO      []YES  Preferred Language for Healthcare:   [x]English       []other:    Functional Scale:       Date assessed:  Quick DASH: raw score = 22; dysfunction = 25%      Pain level: 0/10     SUBJECTIVE:   Feels like he's improving but still having difficulty moving his arm like he used to.          OBJECTIVE:    Observation:    Test measurements:     12/10: AAROM L shoulder on pullies:  Flexion 125 deg,    : PROM L shoulder flexion: 140 deg    : pt 8 min late    PROM AROM     L R L R   Shoulder Flexion  151 deg   Deferred WFL   Shoulder Scaption 124 deg   Deferred [facility]   Shoulder External Rotation  60 deg at 90 abd   Deferred [facility]   Shoulder Internal Rotation  65 deg at 90 abd   Deferred WFL       RESTRICTIONS/PRECAUTIONS: on 11/05: Patient may start passive range of motion exercises of the shoulder only for the next 4 weeks. May then start a active assistive range of motion exercise program of the shoulder. Active range of motion exercises may start at 8 weeks. No     strength training to 3 months postop. May perform active and passive range of motion exercises of the elbow wrist and hand. Exercises/Interventions:   Therapeutic Exercise (32844)  Start time: 1:33 pm  End time: 1:52 pm Resistance / level Sets / Seconds  Reps Notes/Cues   AROM STARTS 1/7/21                     Pulleys Flex/Scaption   X 20 ea    Wand Supine Shoulder Flexion & scaption   x20 ea -UE ranger   Wand Shoulder ER  Chest press   x20  x15 -UE ranger   Table Flexion Slides   Flexion slides at HR  Scaption slides at HR    25 L  25 L   Wall slides flex     X 15 L    Scap squeezes/shoulder rolls -supine   X 25           Pron/Supination     Wrist Flex & Ext Stretch   **Man   Wrist Flex/Ext,RD resisted       Pendulums CW/CCW, flex/ext, horiz abd/add       Bicep curls - palm up  Bicep curls - palm in    L Elbow flex & ext  -shld stabilized at 90 deg flex  -shld stabilized at 90 deg abd     20  20                         Therapeutic Activities (60357)  Start time:   End time:                                           Neuromuscular Re-ed (75530)  Start time:   End time:                                           Manual Intervention ([id number])  Start time: 1:52 pm  End time: 2:04 pm       Shld /GH Mobs       Post Cap mobs       Thoracic/Rib manipualtion       CT MT/Mobs       L shoulder PROM into flex, scap, ER & IR in LPP  10 mins      Supine L upper trap stretch  3x20\"         Pt. Education:  11/17:  -pt educated on diagnosis, prognosis and expectations for rehab  -all pt questions were answered    Home Exercise Program:    Access Code: [email or web address]   URL: [email or web address]   Date: 12/07/2020   Prepared by: Tejal Deluna Loi     Exercises  Supine Shoulder Flexion Extension AAROM with Dowel - 10 reps - 2-3 sets - 2x daily - 7x weekly  Seated Shoulder Flexion Towel Slide at Table Top - 10 reps - 2-3 sets - 5- 10 secs hold - 2x daily - 7x weekly  Scaption Wall Slide with Towel - 10 reps - 2-3 sets - 2x daily - 7x weekly      Access Code: [email or web address]   URL: [employer]/   Date: 11/16/2020   Prepared by: Dario Mike     Exercises   · Seated Scapular Retraction - 10 reps - 3x daily - 7x weekly   · Seated Elbow Flexion and Extension AROM - 10 reps - 3x daily - 7x weekly   · Seated Forearm Pronation and Supination AROM - 10 reps - 3x daily - 7x weekly   · Seated Wrist Flexion with Overpressure - 10 reps - 3x daily - 7x weekly   · Seated Wrist Flexion Extension PROM - 10 reps - 3x daily - 7x weekly   · Flexion-Extension Shoulder Pendulum with Table Support - 10 reps - 3x daily - 7x weekly   · Horizontal Shoulder Pendulum with Table Support - 10 reps - 3x daily - 7x weekly   · Circular Shoulder Pendulum with Table Support - 10 reps - 3x daily - 7x weekly        Therapeutic Exercise and NMR EXR  [x] (15506) Provided verbal/tactile cueing for activities related to strengthening, flexibility, endurance, ROM  for improvements in scapular, scapulothoracic and UE control with self care, reaching, carrying, lifting, house/yardwork, driving/computer work.    [] (12261) Provided verbal/tactile cueing for activities related to improving balance, coordination, kinesthetic sense, posture, motor skill, proprioception  to assist with  scapular, scapulothoracic and UE control with self care, reaching, carrying, lifting, house/yardwork, driving/computer work.     Therapeutic Activities:    [] (62604 or 16224) Provided verbal/tactile cueing for activities related to improving balance, coordination, kinesthetic sense, posture, motor skill, proprioception and motor activation to allow for proper function of scapular, scapulothoracic and UE control with self care, carrying, lifting, driving/computer work. Home Exercise Program:    [x] (87564) Reviewed/Progressed HEP activities related to strengthening, flexibility, endurance, ROM of scapular, scapulothoracic and UE control with self care, reaching, carrying, lifting, house/yardwork, driving/computer work  [] (88946) Reviewed/Progressed HEP activities related to improving balance, coordination, kinesthetic sense, posture, motor skill, proprioception of scapular, scapulothoracic and UE control with self care, reaching, carrying, lifting, house/yardwork, driving/computer work      Manual Treatments:  PROM / STM / Oscillations-Mobs:  G-I, II, III, IV (PA's, Inf., Post.)  [x] (11993) Provided manual therapy to mobilize soft tissue/joints of cervical/CT, scapular GHJ and UE for the purpose of modulating pain, promoting relaxation,  increasing ROM, reducing/eliminating soft tissue swelling/inflammation/restriction, improving soft tissue extensibility and allowing for proper ROM for normal function with self care, reaching, carrying, lifting, house/yardwork, driving/computer work    Modalities:  12/10:  Encouraged pt to ice at home to better manage pain and swelling. 11/16, 11/20: declined CP  Start time:  End time:     Charges:  Timed Code Treatment Minutes: 31   Total Treatment Minutes: 31     [] EVAL - LOW (70364)   [] EVAL - MOD (33206)  [] EVAL - HIGH (73366)  [] RE-EVAL (88188)  [x] MS(18957) x 1       [] Ionto  [] NMR (97267) x       [] Vaso  [x] Manual (69595) x 1       [] Ultrasound  [] TA x        [] Mech Traction (07766)  [] Aquatic Therapy x      [] ES (un) (30531):   [] Home Management Training x  [] ES(attended) (00324)   [] Dry Needling 1-2 muscles (24509):  [] Dry Needling 3+ muscles (800077  [] Group:      [] Other:                     GOALS:  Patient stated goal: alleviate pain, return to work  []? Progressing: []? Met: []? Not Met: []?  Adjusted     Therapist goals for Patient: []Sleeping   [x]Reaching [x]Lifting   [x]ADLs [x]Housework  [x]Driving [x]Job related tasks  []Sports/Recreation []Other:    ASSESSMENT:   Tolerated AAROM well, however GH ER PROM appears more restricted limiting flexion without compensations. Continue to progress AAROM and PROM as tolerated. Pt plans to contact MD to move up apt in order to establish new C9 for additional visits instead of waiting until 1/4/21. Treatment/Activity Tolerance:  [x] Pt able to complete treatment [] Patient limited by fatique  [] Patient limited by pain  [] Patient limited by other medical complications  [] Other:     Prognosis:  [x] Good [] Fair  [] Poor    Patient Requires Follow-up: [x] Yes  [] No    Return to Play:    [x]  N/A     []  Stage 1: Intro to Strength   []  Stage 2: Dynamic Strength and Intro to Plyometrics   []  Stage 3: Advanced Plyometrics and Intro to Throwing   []  Stage 4: Sport specific Training/Return to Sport     []  Ready to Return to Play, [employer]   []  Not Ready for Return to Sports   Comments:      PLAN: See eval. PT 2x / week for 6 weeks. [x] Continue per plan of care [] Alter current plan (see comments)  [] Plan of care initiated [] Hold pending MD visit [] Discharge    Electronically signed by: Enedina Jenkins PT      Note: If patient does not return for scheduled/ recommended follow up visits, this note will serve as a discharge from care along with most recent update on progress.

## 2020-12-21 ENCOUNTER — HOSPITAL ENCOUNTER (OUTPATIENT)
Dept: PHYSICAL THERAPY | Age: 60
Setting detail: THERAPIES SERIES
Discharge: HOME | End: 2020-12-21
Payer: COMMERCIAL

## 2020-12-21 PROCEDURE — 97140 MANUAL THERAPY 1/> REGIONS: CPT

## 2020-12-21 PROCEDURE — 97110 THERAPEUTIC EXERCISES: CPT

## 2020-12-21 NOTE — FLOWSHEET NOTE
[facility]  Phone: [phone]   Fax: [phone]    Physical Therapy Treatment Note/ Progress Report:     Date:  2020    Patient Name:  Suzette Mohr    :  1960  MRN: [id number]  Restrictions/Precautions:    Medical/Treatment Diagnosis Information:  · Diagnosis: M25.512 (ICD-10-CM) - Left shoulder pain DOS 10/27/20   · Treatment Diagnosis: Decreased ROM & strength of L shoulder following RTC tear and repair on 3633  Insurance/Certification information:  PT Insurance Information: [employer] - Comp management. Claim #[id number];  DOI 10/07/2020. JTU#[phone]  Physician Information:  Referring Practitioner: Magdalena Wayne MD  Plan of care signed (Y/N): []  Yes [x]  No     Date of Patient follow up with Physician: ,      Progress Report: []  Yes  [x]  No     Date Range for reporting period:  Beginnin/10  Ending:      Progress report due (10 Rx/or 30 days whichever is less): visit #10 or  (date)     Recertification due (POC duration/ or 90 days whichever is less): visit #12 or 2020 (date)     Visit # Insurance Allowable Auth required? Date Range   12 []  Yes  []  No C9 expires 20     Latex Allergy:  [x]NO      []YES  Preferred Language for Healthcare:   [x]English       []other:    Functional Scale:       Date assessed:  Quick DASH: raw score = 22; dysfunction = 25%      Pain level: 1.5-2/10     SUBJECTIVE:    Tried to get into an apt earlier w/surgeon but is too busy, still has follow-up apt scheduled for 21. Per pt, workers comp plans to contact MD to get C9 started. Feels that shoulder is getting better.          OBJECTIVE:    Observation:    Test measurements:     12/10: AAROM L shoulder on pullies:  Flexion 125 deg,    : PROM L shoulder flexion: 140 deg    : pt 8 min late    PROM AROM     L R L R   Shoulder Flexion  151 deg   Deferred [facility]   Shoulder Scaption 124 deg   Deferred [facility]   Shoulder External Rotation  60 deg at 90 abd   Deferred [facility]   Shoulder Internal Rotation  65 deg at 90 abd   Deferred WFL       RESTRICTIONS/PRECAUTIONS: on 11/05: Patient may start passive range of motion exercises of the shoulder only for the next 4 weeks. May then start a active assistive range of motion exercise program of the shoulder. Active range of motion exercises may start at 8 weeks. No     strength training to 3 months postop. May perform active and passive range of motion exercises of the elbow wrist and hand. Exercises/Interventions:   Therapeutic Exercise (36385)  Start time: 1:46 pm  End time 2:09  pm Resistance / level Sets / Seconds  Reps Notes/Cues   AROM STARTS 1/7/21                     Pulleys Flex/Scaption   X 30 ea    Wand Supine Shoulder Flexion & scaption   x25 ea -UE ranger   Wand Shoulder ER  Wand Chest press   x25  x30 -UE ranger   Table Flexion Slides   Flexion slides at HR  Scaption slides at HR    25 L  25 L   Wall slides flex     X 25 L    Scap squeezes/shoulder rolls -supine              Pron/Supination     Wrist Flex & Ext Stretch   **Man   Wrist Flex/Ext,RD resisted       Pendulums CW/CCW, flex/ext, horiz abd/add       Bicep curls - palm up  Bicep curls - palm in    L Elbow flex & ext  -shld stabilized at 90 deg flex  -shld stabilized at 90 deg abd                              Therapeutic Activities (92457)  Start time:   End time:                                           Neuromuscular Re-ed (84495)  Start time:   End time:                                           Manual Intervention ([id number])  Start time: 2:10 pm  End time: 2:18 pm       Shld /GH Mobs       Post Cap mobs       Thoracic/Rib manipualtion       CT MT/Mobs       L shoulder PROM into flex, scap, ER & IR in LPP  8 mins      Supine L upper trap stretch           Pt.  Education:  11/17:  -pt educated on diagnosis, prognosis and expectations for rehab  -all pt questions were answered    Home posture, motor skill, proprioception and motor activation to allow for proper function of scapular, scapulothoracic and UE control with self care, carrying, lifting, driving/computer work. Home Exercise Program:    [x] (72809) Reviewed/Progressed HEP activities related to strengthening, flexibility, endurance, ROM of scapular, scapulothoracic and UE control with self care, reaching, carrying, lifting, house/yardwork, driving/computer work  [] (04824) Reviewed/Progressed HEP activities related to improving balance, coordination, kinesthetic sense, posture, motor skill, proprioception of scapular, scapulothoracic and UE control with self care, reaching, carrying, lifting, house/yardwork, driving/computer work      Manual Treatments:  PROM / STM / Oscillations-Mobs:  G-I, II, III, IV (PA's, Inf., Post.)  [x] (58702) Provided manual therapy to mobilize soft tissue/joints of cervical/CT, scapular GHJ and UE for the purpose of modulating pain, promoting relaxation,  increasing ROM, reducing/eliminating soft tissue swelling/inflammation/restriction, improving soft tissue extensibility and allowing for proper ROM for normal function with self care, reaching, carrying, lifting, house/yardwork, driving/computer work    Modalities:  12/10:  Encouraged pt to ice at home to better manage pain and swelling.     11/16, 11/20: declined CP  Start time:  End time:     Charges:  Timed Code Treatment Minutes: 32   Total Treatment Minutes: 32     [] EVAL - LOW (43120)   [] EVAL - MOD (98635)  [] EVAL - HIGH (63312)  [] RE-EVAL (57646)  [x] FE(50891) x 1       [] Ionto  [] NMR (20613) x       [] Vaso  [x] Manual (45767) x 1       [] Ultrasound  [] TA x        [] Mech Traction (06818)  [] Aquatic Therapy x      [] ES (un) (70695):   [] Home Management Training x  [] ES(attended) (67418)   [] Dry Needling 1-2 muscles (29243):  [] Dry Needling 3+ muscles (756048  [] Group:      [] Other:                     GOALS:  Patient stated goal: alleviate pain, return to work  []? Progressing: []? Met: []? Not Met: []? Adjusted     Therapist goals for Patient:   Short Term Goals: To be achieved in: 2 weeks  1. Independent in HEP and progression per patient tolerance, in order to prevent re-injury. [x]? Progressing: []? Met: []? Not Met: []? Adjusted  2. Patient will have a decrease in pain to facilitate improvement in movement, function, and ADLs as indicated by Functional Deficits. [x]? Progressing: []? Met: []? Not Met: []? Adjusted     Long Term Goals: To be achieved in: 12 weeks  1. Disability index score of 5% or less for the Quick DASH to assist with reaching prior level of function. []? Progressing: []? Met: [x]? Not Met: []? Adjusted  2. Patient will demonstrate increased L shoulder AROM to [facility] to allow for proper joint functioning as indicated by patients Functional Deficits. []? Progressing: []? Met: [x]? Not Met: []? Adjusted  3. Patient will demonstrate an increase in L shoulder strength to 4/5 to allow for proper functional mobility as indicated by patients Functional Deficits. []? Progressing: []? Met: [x]? Not Met: []? Adjusted  4. Patient will return to functional activities including sleeping and driving without increased symptoms or restriction. []? Progressing: []? Met: [x]? Not Met: []? Adjusted  5. Patient to lift 25# pain-free in order to return normal work duty unrestricted. []? Progressing: []? Met: [x]? Not Met: []? Adjusted            Overall Progression Towards Functional goals/ Treatment Progress Update:  [] Patient is progressing as expected towards functional goals listed. [] Progression is slowed due to complexities/Impairments listed. [] Progression has been slowed due to co-morbidities.   [x] Plan just implemented, too soon to assess goals progression <30days   [] Goals require adjustment due to lack of progress  [] Patient is not progressing as expected and requires additional follow up with physician  [] Other    Persisting Functional Limitations/Impairments:  []Sitting []Standing   []Transfers  []Sleeping   [x]Reaching [x]Lifting   [x]ADLs [x]Housework  [x]Driving [x]Job related tasks  []Sports/Recreation []Other:    ASSESSMENT:    Wall walks performed following HR slides at stairs, pt able to demo improved tolerance and AAROM to wall walks. Pt able to demo improved supine AAROM flex as well. Continue to progress POC, reassess pt next session due to 12/12 visits. Treatment/Activity Tolerance:  [x] Pt able to complete treatment [] Patient limited by fatique  [] Patient limited by pain  [] Patient limited by other medical complications  [] Other:     Prognosis:  [x] Good [] Fair  [] Poor    Patient Requires Follow-up: [x] Yes  [] No    Return to Play:    [x]  N/A     []  Stage 1: Intro to Strength   []  Stage 2: Dynamic Strength and Intro to Plyometrics   []  Stage 3: Advanced Plyometrics and Intro to Throwing   []  Stage 4: Sport specific Training/Return to Sport     []  Ready to Return to Play, [employer]   []  Not Ready for Return to Sports   Comments:      PLAN: See eval. PT 2x / week for 6 weeks. [x] Continue per plan of care [] Alter current plan (see comments)  [] Plan of care initiated [] Hold pending MD visit [] Discharge    Electronically signed by: Patricia Talavera PT      Note: If patient does not return for scheduled/ recommended follow up visits, this note will serve as a discharge from care along with most recent update on progress.

## 2020-12-23 ENCOUNTER — HOSPITAL ENCOUNTER (OUTPATIENT)
Dept: PHYSICAL THERAPY | Age: 60
Setting detail: THERAPIES SERIES
Discharge: HOME | End: 2020-12-23
Payer: COMMERCIAL

## 2020-12-23 PROCEDURE — 97110 THERAPEUTIC EXERCISES: CPT

## 2020-12-23 PROCEDURE — 97140 MANUAL THERAPY 1/> REGIONS: CPT

## 2020-12-23 NOTE — FLOWSHEET NOTE
Gilles Tellez  Phone: [phone]   Fax: [phone]     Physical Therapy Re-Certification Plan of Care    Dear Elaine Gates MD,    We had the pleasure of treating the following patient for physical therapy services at [facility]. A summary of our findings can be found in the updated assessment below. This includes our plan of care. If you have any questions or concerns regarding these findings, please do not hesitate to contact me at the office phone number checked above. Thank you for the referral.     Physician Signature:________________________________Date:__________________  By signing above (or electronic signature), therapists plan is approved by physician      Functional Outcome: Quick DASH: raw score = 32; dysfunction = 47.73%    Overall Response to Treatment:   []Patient is responding well to treatment and improvement is noted with regards  to goals   []Patient should continue to improve in reasonable time if they continue HEP   []Patient has plateaued and is no longer responding to skilled PT intervention    []Patient is getting worse and would benefit from return to referring MD   []Patient unable to adhere to initial POC   [x]Other:   Patient's L shoulder PROM into flex reduced but scaption improved since eval.  AAROM is progressing well and pt's pain remains low. Patient's C9 expires 12/31 and pt has completed all visits on C9, requires new C9 to continue PT services. Pt is aware and has follow-up w/surgeon on 1/4/21. Patient can continue to benefit from PT services to progress L shoulder stability and ROM per MD protocol. Date range of Visits: 11/16 - 12/23/2020  Total Visits: 12    Recommendation:    [x]Request additional PT visits at 2x / wk for 6-8 weeks. Patient does require new C9. Pt has completed all visits on current C9 which expires 12/31.    []Hold PT, pending MD visit          Physical Therapy Treatment Note/ Progress Report:     Date:  2020    Patient Name:  Shante Serrato    :  1960  MRN: [id number]  Restrictions/Precautions:    Medical/Treatment Diagnosis Information:  · Diagnosis: M25.512 (ICD-10-CM) - Left shoulder pain DOS 10/27/20   · Treatment Diagnosis: Decreased ROM & strength of L shoulder following RTC tear and repair on   Insurance/Certification information:  PT Insurance Information: [employer] - Comp management. Claim #[id number];  DOI 10/07/2020. YPZ#[phone]  Physician Information:  Referring Practitioner: Chele Blanco MD (fax # [phone])  Plan of care signed (Y/N): []  Yes [x]  No     Date of Patient follow up with Physician: ,      Progress Report: [x]  Yes  []  No     Date Range for reporting period:  Beginnin/10  Endin/23    Progress report due (10 Rx/or 30 days whichever is less): visit #10 or  (date)     Recertification due (POC duration/ or 90 days whichever is less): visit #12 or 2020 (date)     Visit # Insurance Allowable Auth required? Date Range   12 []  Yes  []  No C9 expires 20     Latex Allergy:  [x]NO      []YES  Preferred Language for Healthcare:   [x]English       []other:    Functional Scale:       Date assessed:  Quick DASH: raw score = 22; dysfunction = 25%    Quick DASH: raw score = 32; dysfunction = 47.73%     Pain level: 3/10     SUBJECTIVE:    Did a lot of ex's last night so is having more pain this am.  Is sleeping better, pretty much his normal without pain medications. Continues to not require pain medication and can tolerate his lower levels of pain.        OBJECTIVE:    Observation:    Test measurements:     :   Seated AAROM L shoulder on pullies:  Flexion 125 deg, abd 128 deg   Supine PROM L shoulder flex 129 deg, scaption 130 deg     12/10: AAROM L shoulder on pullies:  Flexion 125 deg    : PROM L shoulder flexion: 140 IR in LPP  8 mins      Supine L upper trap stretch           Pt. Education:  11/17:  -pt educated on diagnosis, prognosis and expectations for rehab  -all pt questions were answered    Home Exercise Program:    Access Code: [email or web address]   URL: [email or web address]   Date: 12/07/2020   Prepared by: Mary Aldana     Exercises  Supine Shoulder Flexion Extension AAROM with Dowel - 10 reps - 2-3 sets - 2x daily - 7x weekly  Seated Shoulder Flexion Towel Slide at Table Top - 10 reps - 2-3 sets - 5- 10 secs hold - 2x daily - 7x weekly  Scaption Wall Slide with Towel - 10 reps - 2-3 sets - 2x daily - 7x weekly      Access Code: [email or web address]   URL: [email or web address]   Date: 11/16/2020   Prepared by: Marilin Castro     Exercises   · Seated Scapular Retraction - 10 reps - 3x daily - 7x weekly   · Seated Elbow Flexion and Extension AROM - 10 reps - 3x daily - 7x weekly   · Seated Forearm Pronation and Supination AROM - 10 reps - 3x daily - 7x weekly   · Seated Wrist Flexion with Overpressure - 10 reps - 3x daily - 7x weekly   · Seated Wrist Flexion Extension PROM - 10 reps - 3x daily - 7x weekly   · Flexion-Extension Shoulder Pendulum with Table Support - 10 reps - 3x daily - 7x weekly   · Horizontal Shoulder Pendulum with Table Support - 10 reps - 3x daily - 7x weekly   · Circular Shoulder Pendulum with Table Support - 10 reps - 3x daily - 7x weekly        Therapeutic Exercise and NMR EXR  [x] (07605) Provided verbal/tactile cueing for activities related to strengthening, flexibility, endurance, ROM  for improvements in scapular, scapulothoracic and UE control with self care, reaching, carrying, lifting, house/yardwork, driving/computer work.    [] (50123) Provided verbal/tactile cueing for activities related to improving balance, coordination, kinesthetic sense, posture, motor skill, proprioception  to assist with  scapular, scapulothoracic and UE control with self care, reaching, carrying, lifting, house/yardwork, driving/computer work. Therapeutic Activities:    [] (03266 or 05452) Provided verbal/tactile cueing for activities related to improving balance, coordination, kinesthetic sense, posture, motor skill, proprioception and motor activation to allow for proper function of scapular, scapulothoracic and UE control with self care, carrying, lifting, driving/computer work. Home Exercise Program:    [x] (98792) Reviewed/Progressed HEP activities related to strengthening, flexibility, endurance, ROM of scapular, scapulothoracic and UE control with self care, reaching, carrying, lifting, house/yardwork, driving/computer work  [] (80468) Reviewed/Progressed HEP activities related to improving balance, coordination, kinesthetic sense, posture, motor skill, proprioception of scapular, scapulothoracic and UE control with self care, reaching, carrying, lifting, house/yardwork, driving/computer work      Manual Treatments:  PROM / STM / Oscillations-Mobs:  G-I, II, III, IV (PA's, Inf., Post.)  [x] (21731) Provided manual therapy to mobilize soft tissue/joints of cervical/CT, scapular GHJ and UE for the purpose of modulating pain, promoting relaxation,  increasing ROM, reducing/eliminating soft tissue swelling/inflammation/restriction, improving soft tissue extensibility and allowing for proper ROM for normal function with self care, reaching, carrying, lifting, house/yardwork, driving/computer work    Modalities:  12/10:  Encouraged pt to ice at home to better manage pain and swelling.     11/16, 11/20: declined CP  Start time:  End time:     Charges:  Timed Code Treatment Minutes: 45   Total Treatment Minutes: 45     [] EVAL - LOW (55222)   [] EVAL - MOD (58409)  [] EVAL - HIGH (52462)  [] RE-EVAL (40250)  [x] VN(48977) x 2       [] Ionto  [] NMR (83742) x       [] Vaso  [x] Manual (44320) x 1       [] Ultrasound  [] TA x        [] Mech Traction (48636)  [] Aquatic Therapy x      [] ES (un) (53323):   [] Home Management Training x  [] ES(attended) (16274)   [] Dry Needling 1-2 muscles (86337):  [] Dry Needling 3+ muscles (838419  [] Group:      [] Other:                     GOALS:  Patient stated goal: alleviate pain, return to work  []? Progressing: []? Met: []? Not Met: []? Adjusted     Therapist goals for Patient:   Short Term Goals: To be achieved in: 2 weeks  1. Independent in HEP and progression per patient tolerance, in order to prevent re-injury. []? Progressing: [x]? Met: []? Not Met: []? Adjusted  2. Patient will have a decrease in pain to facilitate improvement in movement, function, and ADLs as indicated by Functional Deficits. []? Progressing: [x]? Met: []? Not Met: []? Adjusted     Long Term Goals: To be achieved in: 12 weeks  1. Disability index score of 5% or less for the Quick DASH to assist with reaching prior level of function. []? Progressing: []? Met: [x]? Not Met: []? Adjusted  2. Patient will demonstrate increased L shoulder AROM to [facility] to allow for proper joint functioning as indicated by patients Functional Deficits. []? Progressing: []? Met: [x]? Not Met: []? Adjusted  3. Patient will demonstrate an increase in L shoulder strength to 4/5 to allow for proper functional mobility as indicated by patients Functional Deficits. []? Progressing: []? Met: [x]? Not Met: []? Adjusted  4. Patient will return to functional activities including sleeping and driving without increased symptoms or restriction. []? Progressing: []? Met: [x]? Not Met: []? Adjusted  5. Patient to lift 25# pain-free in order to return normal work duty unrestricted. []? Progressing: []? Met: [x]? Not Met: []? Adjusted            Overall Progression Towards Functional goals/ Treatment Progress Update:  [x] Patient is progressing as expected towards functional goals listed. [] Progression is slowed due to complexities/Impairments listed. [] Progression has been slowed due to co-morbidities.   [] Plan just implemented, too soon to assess goals progression <30days   [] Goals require adjustment due to lack of progress  [] Patient is not progressing as expected and requires additional follow up with physician  [] Other    Persisting Functional Limitations/Impairments:  []Sitting []Standing   []Transfers  []Sleeping   [x]Reaching [x]Lifting   [x]ADLs [x]Housework  [x]Driving [x]Job related tasks  []Sports/Recreation []Other:    ASSESSMENT:     Patient's L shoulder PROM into flex reduced but scaption improved since eval.  AAROM is progressing well and pt's pain remains low. Patient's C9 expires 12/31 and pt has completed all visits on C9, requires new C9 to continue PT services. Pt is aware and has follow-up w/surgeon on 1/4/21. Patient can continue to benefit from PT services to progress L shoulder stability and ROM per MD protocol. Treatment/Activity Tolerance:  [x] Pt able to complete treatment [] Patient limited by fatique  [] Patient limited by pain  [] Patient limited by other medical complications  [] Other:     Prognosis:  [x] Good [] Fair  [] Poor    Patient Requires Follow-up: [x] Yes  [] No    Return to Play:    [x]  N/A     []  Stage 1: Intro to Strength   []  Stage 2: Dynamic Strength and Intro to Plyometrics   []  Stage 3: Advanced Plyometrics and Intro to Throwing   []  Stage 4: Sport specific Training/Return to Sport     []  Ready to Return to Play, [employer]   []  Not Ready for Return to Sports   Comments:      PLAN: See eval. PT 2x / week for 6 weeks. [x] Continue per plan of care [] Alter current plan (see comments)  [] Plan of care initiated [] Hold pending MD visit [] Discharge    Electronically signed by: Murtaza Alvarez PT      Note: If patient does not return for scheduled/ recommended follow up visits, this note will serve as a discharge from care along with most recent update on progress.

## 2020-12-24 ENCOUNTER — APPOINTMENT (OUTPATIENT)
Dept: PHYSICAL THERAPY | Age: 60
End: 2020-12-24
Payer: COMMERCIAL

## 2020-12-28 ENCOUNTER — APPOINTMENT (OUTPATIENT)
Dept: PHYSICAL THERAPY | Age: 60
End: 2020-12-28
Payer: COMMERCIAL

## 2020-12-31 ENCOUNTER — APPOINTMENT (OUTPATIENT)
Dept: PHYSICAL THERAPY | Age: 60
End: 2020-12-31
Payer: COMMERCIAL

## 2023-01-24 ENCOUNTER — OFFICE VISIT (OUTPATIENT)
Dept: CARDIOLOGY CLINIC | Age: 63
End: 2023-01-24
Payer: COMMERCIAL

## 2023-01-24 VITALS
WEIGHT: 253.1 LBS | HEIGHT: 73 IN | SYSTOLIC BLOOD PRESSURE: 122 MMHG | OXYGEN SATURATION: 98 % | BODY MASS INDEX: 33.54 KG/M2 | DIASTOLIC BLOOD PRESSURE: 66 MMHG | HEART RATE: 108 BPM

## 2023-01-24 DIAGNOSIS — E78.2 MIXED HYPERLIPIDEMIA: ICD-10-CM

## 2023-01-24 DIAGNOSIS — I10 PRIMARY HYPERTENSION: ICD-10-CM

## 2023-01-24 DIAGNOSIS — I47.1 SVT (SUPRAVENTRICULAR TACHYCARDIA) (HCC): ICD-10-CM

## 2023-01-24 DIAGNOSIS — I25.10 CORONARY ARTERY DISEASE INVOLVING NATIVE CORONARY ARTERY OF NATIVE HEART WITHOUT ANGINA PECTORIS: Primary | ICD-10-CM

## 2023-01-24 PROCEDURE — 3074F SYST BP LT 130 MM HG: CPT | Performed by: NURSE PRACTITIONER

## 2023-01-24 PROCEDURE — 93000 ELECTROCARDIOGRAM COMPLETE: CPT | Performed by: NURSE PRACTITIONER

## 2023-01-24 PROCEDURE — 3078F DIAST BP <80 MM HG: CPT | Performed by: NURSE PRACTITIONER

## 2023-01-24 PROCEDURE — 99214 OFFICE O/P EST MOD 30 MIN: CPT | Performed by: NURSE PRACTITIONER

## 2023-01-24 RX ORDER — CARVEDILOL 3.12 MG/1
3.12 TABLET ORAL 2 TIMES DAILY
Qty: 180 TABLET | Refills: 3 | Status: SHIPPED | OUTPATIENT
Start: 2023-01-24

## 2023-01-24 RX ORDER — VITAMIN B COMPLEX
1 CAPSULE ORAL DAILY
COMMUNITY

## 2023-01-24 RX ORDER — ATORVASTATIN CALCIUM 40 MG/1
40 TABLET, FILM COATED ORAL NIGHTLY
Qty: 90 TABLET | Refills: 3 | Status: SHIPPED | OUTPATIENT
Start: 2023-01-24

## 2023-01-24 NOTE — PROGRESS NOTES
[facility]     Outpatient Follow Up Note    Twin Haq is 60 y.o. male who presents today with a history of CAD s/p PTCA '11, NSTEMI s/p PTCA LAD '18, HTN, hyperlipidemia and SVT s/p AVNRT ablation '19      CHIEF COMPLAINT / HPI:  Follow Up secondary to coronary artery disease    Subjective:   He denies significant chest pain. There is no SOB/MENESES. The patient denies orthopnea/PND. The patient does not have swelling. The patients weight is stable . The patient is not experiencing palpitations or dizziness. He's all thankful to [facility] after having his ablation. He feels like a new man. These symptoms show no change since the last OV Dec 20. With regard to medication therapy the patient has been compliant with prescribed regimen. They have tolerated therapy to date.      Past Medical History:   Diagnosis Date    CAD (coronary artery disease)     Hyperlipidemia     Hypertension      Social History:    Social History     Tobacco Use   Smoking Status Never   Smokeless Tobacco Never     Current Medications:  Current Outpatient Medications   Medication Sig Dispense Refill    b complex vitamins capsule Take 1 capsule by mouth daily      vitamin E 400 UNIT capsule Take 400 Units by mouth daily      omeprazole (PRILOSEC) 20 MG delayed release capsule Take 1 capsule by mouth every morning (before breakfast) 90 capsule 0    atorvastatin (LIPITOR) 40 MG tablet Take 1 tablet by mouth nightly 60 tablet 0    carvedilol (COREG) 3.125 MG tablet Take 1 tablet by mouth 2 times daily (Patient taking differently: Take 3.125 mg by mouth daily) 180 tablet 3    vitamin C (ASCORBIC ACID) 500 MG tablet Take 500 mg by mouth daily      vitamin D (CHOLECALCIFEROL) 1000 UNIT TABS tablet Take 1,000 Units by mouth daily      Omega-3 Fatty Acids (FISH OIL) 1000 MG CAPS Take 1,000 mg by mouth daily      Garlic 8580 MG CAPS Take 1 capsule by mouth daily       aspirin 81 MG chewable tablet Take 1 tablet by mouth daily 90 tablet 3    nitroGLYCERIN (NITROSTAT) 0.4 MG SL tablet up to max of 3 total doses. If no relief after 1 dose, call 911. (Patient not taking: Reported on 1/24/2023) 25 tablet 3     No current facility-administered medications for this visit. REVIEW OF SYSTEMS:    CONSTITUTIONAL: No major weight gain or loss, fatigue, weakness, night sweats or fever. HEENT: No new vision difficulties or ringing in the ears. RESPIRATORY: No new SOB, PND, orthopnea or cough. CARDIOVASCULAR: See HPI  GI: No nausea, vomiting, diarrhea, constipation, abdominal pain or changes in bowel habits. : No urinary frequency, urgency, incontinence hematuria or dysuria. SKIN: No cyanosis or skin lesions. MUSCULOSKELETAL: No new muscle or joint pain. NEUROLOGICAL: No syncope or TIA-like symptoms. PSYCHIATRIC: No anxiety, pain, insomnia or depression    Objective:   PHYSICAL EXAM:        VITALS:  /66 (Site: Left Upper Arm, Cuff Size: Large Adult)   Ht 6' 1\" (1.854 m)   Wt 253 lb 1.6 oz (114.8 kg)   BMI 33.39 kg/m²   CONSTITUTIONAL: Cooperative, no apparent distress, and appears well nourished / developed  NEUROLOGIC:  Awake and orientated to person, place and time. PSYCH: Calm affect. SKIN: Warm and dry. HEENT: Sclera non-icteric, normocephalic, neck supple, no elevation of JVP, normal carotid pulses with no bruits and thyroid normal size. LUNGS:  No increased work of breathing and clear to auscultation, no crackles or wheezing  CARDIOVASCULAR:  Regular rate 112 and rhythm with no murmurs, gallops, rubs, or abnormal heart sounds, normal PMI. The apical impulses not displaced  JVP less than 8 cm H2O  Heart tones are crisp and normal  Cervical veins are not engorged  The carotid upstroke is normal in amplitude and contour without delay or bruit  JVP is not elevated  ABDOMEN:  Normal bowel sounds, non-distended and non-tender to palpation  EXT: No edema, no calf tenderness. Pulses are present bilaterally.     DATA:    Lab Results Component Value Date    ALT 15 09/16/2019    AST 15 09/16/2019    ALKPHOS 50 02/11/2019    BILITOT 0.6 02/11/2019     Lab Results   Component Value Date    CREATININE 0.7 (L) 09/16/2019    BUN 19 09/16/2019     09/16/2019    K 4.6 09/16/2019     09/16/2019    CO2 25 09/16/2019     No results found for: TSH, H5OFIPV, C4FMNAT, THYROIDAB  Lab Results   Component Value Date    WBC 7.7 02/12/2019    HGB 11.5 (L) 02/12/2019    HCT 34.5 (L) 02/12/2019    MCV 86.3 02/12/2019     02/12/2019     No components found for: CHLPL  Lab Results   Component Value Date    TRIG 95 09/16/2019    TRIG 172 (H) 07/10/2018     Lab Results   Component Value Date    HDL 56 09/16/2019    HDL 36 (L) 07/10/2018     Lab Results   Component Value Date    LDLCALC 63 09/16/2019    LDLCALC 102 (H) 07/10/2018     Lab Results   Component Value Date    LABVLDL 19 09/16/2019    LABVLDL 34 07/10/2018       Radiology Review:  Pertinent images / reports were reviewed as a part of this visit and reveals the following:       Echo 9/16/19:  Summary   -Normal left ventricle size, moderate wall thickness and normal systolic   function with an estimated ejection fraction of 55%.   -No regional wall motion abnormalities are seen. -Grade I diastolic dysfunction with normal LV filling pressures. E/e\"=10.55.   -Mild mitral regurgitation.   -Trivial tricuspid regurgitation.   -Estimated pulmonary artery systolic pressure is normal at 16-21 mmHg   assuming a right atrial pressure of 3 mmHg. Cardiac Cath 7/9/18:  Anatomy:   LM-normal  LAD-patent proximal stent, 100% mid  Cx-20% proximal and mid  OM1- patent  RCA-10% distal diffuse, codominant  RPDA- patent  LVEF- 40% with mid to distal anterior hypokinesis  PCI: % to 0% with a 2.75 mm x 16 mm [employer] scientific [employer] INNA. Impression:  1. Critically occluded mid LAD with successful revascularization using INNA. 2.  Mild to moderate LV systolic dysfunction.        Assessment: Diagnosis Orders   1. Coronary artery disease involving native coronary artery of native heart without angina pectoris   ~stable : denies angina  ~s/p PTCA '11  ~NSTEMI s/p PTCA LAD '18  ~DAPT / statin / BB  ~LVEF normal on echo from '19       2. Primary hypertension   ~controlled on current regimen        3. Mixed hyperlipidemia   ~not recently done ; well controlled on profile from '19  ~atorvastatin 40 mg daily Comprehensive Metabolic Panel    Lipid, Fasting      4. SVT (supraventricular tachycardia) (HCC)   ~stable : denies palpitations  ~AP regular  ~s/p RFA '19  ~taking carvedilol once daily vs bid as prescribed  ~LA WNL Magnesium    EKG 12 lead          I had the opportunity to review the clinical symptoms and presentation of Sapna Sheets. Plan:     EKG : sinus rhythm   CMP/Mg+ and lipid profile today  F/U in 9 months    Overall the patient is stable from CV standpoint    I have addresed the patient's cardiac risk factors and adjusted pharmacologic treatment as needed. In addition, I have reinforced the need for patient directed risk factor modification. Further evaluation will be based upon the patient's clinical course and testing results. All questions and concerns were addressed to the patient. Alternatives to my treatment were discussed. The patient is not currently smoking. The risks related to smoking were reviewed with the patient. Recommend maintaining a smoke-free lifestyle. Patient is on a beta-blocker  Patient is on an ace-i/ARB  Patient is on a statin    Dual Antiplatelet therapy has been recommended / prescribed for this patient. Education conducted on adverse reactions including bleeding was discussed. The patient verbalizes understanding not to stop medications without discussing with us. Discussed exercise: 30-60 minutes 7 days/week  Discussed Low saturated fat/MANDEEP diet. Thank you for allowing to us to participate in the care of [address] [place], [place] [address]e.     Jenna White ADAMA Dela Cruz    Documentation of today's visit sent to PCP

## 2023-01-31 ENCOUNTER — HOSPITAL ENCOUNTER (OUTPATIENT)
Age: 63
Discharge: HOME | End: 2023-01-31
Payer: COMMERCIAL

## 2023-01-31 DIAGNOSIS — E78.2 MIXED HYPERLIPIDEMIA: ICD-10-CM

## 2023-01-31 DIAGNOSIS — I47.1 SVT (SUPRAVENTRICULAR TACHYCARDIA) (HCC): ICD-10-CM

## 2023-01-31 LAB
A/G RATIO: 1.5 (ref 1.1–2.2)
ALBUMIN SERPL-MCNC: 4.4 G/DL (ref 3.4–5)
ALP BLD-CCNC: 65 U/L (ref 40–129)
ALT SERPL-CCNC: 34 U/L (ref 10–40)
ANION GAP SERPL CALCULATED.3IONS-SCNC: 13 MMOL/L (ref 3–16)
AST SERPL-CCNC: 31 U/L (ref 15–37)
BILIRUB SERPL-MCNC: 1 MG/DL (ref 0–1)
BUN BLDV-MCNC: 9 MG/DL (ref 7–20)
CALCIUM SERPL-MCNC: 9.7 MG/DL (ref 8.3–10.6)
CHLORIDE BLD-SCNC: 98 MMOL/L (ref 99–110)
CHOLESTEROL, FASTING: 234 MG/DL (ref 0–199)
CO2: 26 MMOL/L (ref 21–32)
CREAT SERPL-MCNC: 0.6 MG/DL (ref 0.8–1.3)
GFR SERPL CREATININE-BSD FRML MDRD: >60 ML/MIN/{1.73_M2}
GLUCOSE BLD-MCNC: 194 MG/DL (ref 70–99)
HDLC SERPL-MCNC: 40 MG/DL (ref 40–60)
LDL CHOLESTEROL CALCULATED: 160 MG/DL
MAGNESIUM: 1.9 MG/DL (ref 1.8–2.4)
POTASSIUM SERPL-SCNC: 4.9 MMOL/L (ref 3.5–5.1)
SODIUM BLD-SCNC: 137 MMOL/L (ref 136–145)
TOTAL PROTEIN: 7.3 G/DL (ref 6.4–8.2)
TRIGLYCERIDE, FASTING: 170 MG/DL (ref 0–150)
VLDLC SERPL CALC-MCNC: 34 MG/DL

## 2023-01-31 PROCEDURE — 36415 COLL VENOUS BLD VENIPUNCTURE: CPT

## 2023-01-31 PROCEDURE — 80053 COMPREHEN METABOLIC PANEL: CPT

## 2023-01-31 PROCEDURE — 83735 ASSAY OF MAGNESIUM: CPT

## 2023-01-31 PROCEDURE — 80061 LIPID PANEL: CPT

## 2023-02-01 ENCOUNTER — TELEPHONE (OUTPATIENT)
Dept: CARDIOLOGY CLINIC | Age: 63
End: 2023-02-01

## 2023-02-01 NOTE — TELEPHONE ENCOUNTER
Called patient no answer,no voicemail set up. [place] Ramsey, ADAMA Coronel CNP   2/1/2023  8:02 AM EST       LDL too high ; change atorvastatin to rosuvastatin 40 mg daily, reinforce diet and recheck labs after 6 weeks.   He may need either zetia added or a [facility]-9

## 2023-02-02 DIAGNOSIS — E78.2 MIXED HYPERLIPIDEMIA: Primary | ICD-10-CM

## 2023-02-02 RX ORDER — ROSUVASTATIN CALCIUM 40 MG/1
40 TABLET, COATED ORAL DAILY
Qty: 90 TABLET | Refills: 1 | Status: SHIPPED | OUTPATIENT
Start: 2023-02-02

## 2023-02-02 NOTE — TELEPHONE ENCOUNTER
Called patient gave instructions. He verbalized understanding. Will e-scribe crestor [facility] near [address] didn't know  what [address] he just mentioned [address].

## 2023-02-23 ENCOUNTER — TELEPHONE (OUTPATIENT)
Dept: CARDIOLOGY CLINIC | Age: 63
End: 2023-02-23

## (undated) DEVICE — FIAPC® PROBE W/ FILTER 2200 A OD 2.3MM/6.9FR; L 2.2M/7.2FT: Brand: ERBE

## (undated) DEVICE — FORCEPS BX L240CM WRK CHN 2.8MM STD CAP W/ NDL MIC MESH

## (undated) DEVICE — BW-412T DISP COMBO CLEANING BRUSH: Brand: SINGLE USE COMBINATION CLEANING BRUSH

## (undated) DEVICE — SET VLV 3 PC AWS DISPOSABLE GRDIAN SCOPEVALET

## (undated) DEVICE — PROCEDURE KIT ENDOSCP CUST

## (undated) DEVICE — MOUTHPIECE ENDOSCP L CTRL OPN AND SIDE PORTS DISP

## (undated) DEVICE — SOLUTION IV IRRIG WATER 500ML POUR BRL ST 2F7113

## (undated) DEVICE — ELECTRODE PT RET AD L9FT HI MOIST COND ADH HYDRGEL CORDED